# Patient Record
Sex: FEMALE | Race: ASIAN | NOT HISPANIC OR LATINO | Employment: FULL TIME | URBAN - METROPOLITAN AREA
[De-identification: names, ages, dates, MRNs, and addresses within clinical notes are randomized per-mention and may not be internally consistent; named-entity substitution may affect disease eponyms.]

---

## 2017-01-04 ENCOUNTER — OFF PREMISE (OUTPATIENT)
Dept: OTHER | Age: 27
End: 2017-01-04

## 2017-01-04 LAB
25(OH)D3+25(OH)D2 SERPL-MCNC: 16.8 NG/ML (ref 30–100)
ALBUMIN SERPL-MCNC: 4.3 G/DL (ref 3.8–5)
ALP SERPL-CCNC: 80 U/L (ref 35–104)
ALT SERPL-CCNC: 14 U/L (ref 6–37)
AST SERPL-CCNC: 16 U/L (ref 11–33)
BASOPHILS # BLD: 0.04 10E3/UL (ref 0–0.08)
BASOPHILS NFR BLD: 1 % (ref 0–1)
BILIRUB SERPL-MCNC: 0.3 MG/DL (ref 0.2–1.2)
BUN SERPL-MCNC: 5 MG/DL (ref 6–23)
CALCIUM SERPL-MCNC: 9.4 MG/DL (ref 8.6–10.2)
CHLORIDE SERPL-SCNC: 104 MMOL/L (ref 96–105)
CHOLEST SERPL-MCNC: 159 MG/DL
CHOLEST/HDLC SERPL: 2.65 {RATIO}
CO2 SERPL-SCNC: 27 MMOL/L (ref 22–29)
CREAT SERPL-MCNC: 0.34 MG/DL (ref 0.5–1.1)
CREAT UR-MCNC: 17 MG/DL
EOSINOPHIL # BLD: 0.02 10E3/UL (ref 0.03–0.52)
EOSINOPHIL NFR BLD: 0 % (ref 0–6)
ERYTHROCYTE [DISTWIDTH] IN BLOOD: 13.2 % (ref 11–14.9)
EST. AVERAGE GLUCOSE BLD GHB EST-MCNC: 97 MG/DL
GLUCOSE SERPL-MCNC: 90 MG/DL (ref 65–99)
HBA1C MFR BLD: 5 % (ref 3.9–6.1)
HCT VFR BLD CALC: 40 % (ref 34–45)
HDLC SERPL-MCNC: 60 MG/DL (ref 40–60)
HGB BLD-MCNC: 12.8 G/DL (ref 11.3–15.1)
IMMATURE GRANULOCYTES (OFFPRE25): 0 % (ref 0–1)
LDLC SERPL CALC-MCNC: 88 MG/DL
LYMPHOCYTES # BLD: 2.1 10E3/UL (ref 0.9–3.2)
LYMPHOCYTES NFR BLD: 25 % (ref 17–46)
MCH RBC QN AUTO: 31.8 PG (ref 25.7–34.1)
MCHC RBC AUTO-ENTMCNC: 32 G/DL (ref 32–36)
MCV RBC AUTO: 99 FL (ref 79–98)
MICROALBUMIN UR-MCNC: <12 MG/L
MICROALBUMIN/CREAT UR: NORMAL MG/G{CREAT} (ref 0–30)
MONOCYTES # BLD: 0.67 10E3/UL (ref 0.26–0.86)
MONOCYTES NFR BLD: 8 % (ref 4–13)
MPV (OFFPRE2): 12.2 FL (ref 9–11.8)
NEUTROPHILS # BLD: 5.46 10E3/UL (ref 1.9–7.8)
NEUTROPHILS NFR BLD: 66 % (ref 43–74)
NONHDLC SERPL-MCNC: 99 MG/DL
PLATELET # BLD: 278 10E3/UL (ref 165–366)
POTASSIUM SERPL-SCNC: 4.5 MMOL/L (ref 3.4–5.1)
PROT SERPL-MCNC: 7.3 G/DL (ref 6.1–8.2)
RBC # BLD: 4 10E6/UL (ref 3.7–5.2)
SODIUM SERPL-SCNC: 142 MMOL/L (ref 136–145)
TRIGL SERPL-MCNC: 57 MG/DL
TSH SERPL-ACNC: 3.46 UIU/ML (ref 0.45–4.5)
WBC # BLD: 8.3 10E3/UL (ref 3.9–11.2)

## 2017-01-12 ENCOUNTER — OFF PREMISE (OUTPATIENT)
Dept: OTHER | Age: 27
End: 2017-01-12

## 2017-01-12 LAB
MEV IGG SER QL IA: 1.4 AI
RUBV IGG SERPL IA-ACNC: 22.6 IU/ML
VARICELLA-ZOSTER VIRUS IGG AB: >8 AI

## 2017-01-26 ENCOUNTER — OFF PREMISE (OUTPATIENT)
Dept: OTHER | Age: 27
End: 2017-01-26

## 2017-02-14 ENCOUNTER — OFF PREMISE (OUTPATIENT)
Dept: OTHER | Age: 27
End: 2017-02-14

## 2017-02-28 ENCOUNTER — OFF PREMISE (OUTPATIENT)
Dept: OTHER | Age: 27
End: 2017-02-28

## 2017-03-24 ENCOUNTER — OFFICE VISIT (OUTPATIENT)
Dept: OTOLARYNGOLOGY | Age: 27
End: 2017-03-24

## 2017-03-24 ENCOUNTER — APPOINTMENT (OUTPATIENT)
Dept: INTERPRETER SERVICES | Age: 27
End: 2017-03-24

## 2017-03-24 ENCOUNTER — IMAGING SERVICES (OUTPATIENT)
Dept: GENERAL RADIOLOGY | Age: 27
End: 2017-03-24

## 2017-03-24 ENCOUNTER — LAB SERVICES (OUTPATIENT)
Dept: LAB | Age: 27
End: 2017-03-24

## 2017-03-24 VITALS
WEIGHT: 165 LBS | DIASTOLIC BLOOD PRESSURE: 64 MMHG | SYSTOLIC BLOOD PRESSURE: 104 MMHG | HEART RATE: 57 BPM | OXYGEN SATURATION: 99 %

## 2017-03-24 DIAGNOSIS — R05.3 CHRONIC COUGH: ICD-10-CM

## 2017-03-24 DIAGNOSIS — M26.609 TMJ (TEMPOROMANDIBULAR JOINT SYNDROME): ICD-10-CM

## 2017-03-24 DIAGNOSIS — R05.3 CHRONIC COUGH: Primary | ICD-10-CM

## 2017-03-24 DIAGNOSIS — H92.03 OTALGIA, BILATERAL: ICD-10-CM

## 2017-03-24 DIAGNOSIS — K21.9 LPRD (LARYNGOPHARYNGEAL REFLUX DISEASE): ICD-10-CM

## 2017-03-24 DIAGNOSIS — R09.A2 GLOBUS PHARYNGEUS: ICD-10-CM

## 2017-03-24 PROCEDURE — 99244 OFF/OP CNSLTJ NEW/EST MOD 40: CPT | Performed by: OTOLARYNGOLOGY

## 2017-03-24 PROCEDURE — 71020 XR CHEST PA AND LATERAL: CPT | Performed by: RADIOLOGY

## 2017-03-24 PROCEDURE — 31575 DIAGNOSTIC LARYNGOSCOPY: CPT | Performed by: OTOLARYNGOLOGY

## 2017-03-24 RX ORDER — METHOCARBAMOL 500 MG/1
500 TABLET, FILM COATED ORAL 4 TIMES DAILY
COMMUNITY
End: 2018-02-07 | Stop reason: ALTCHOICE

## 2017-03-24 RX ORDER — OMEPRAZOLE 20 MG/1
20 CAPSULE, DELAYED RELEASE ORAL DAILY
COMMUNITY
End: 2017-03-24 | Stop reason: DRUGHIGH

## 2017-03-24 RX ORDER — OMEPRAZOLE 40 MG/1
40 CAPSULE, DELAYED RELEASE ORAL DAILY
Qty: 30 CAPSULE | Refills: 12 | Status: SHIPPED | OUTPATIENT
Start: 2017-03-24 | End: 2018-02-07 | Stop reason: ALTCHOICE

## 2017-03-24 RX ORDER — FLUTICASONE PROPIONATE 50 MCG
1 SPRAY, SUSPENSION (ML) NASAL DAILY
COMMUNITY
End: 2017-04-21 | Stop reason: SDUPTHER

## 2017-03-24 RX ORDER — NAPROXEN 500 MG/1
500 TABLET ORAL
COMMUNITY
End: 2018-02-07 | Stop reason: ALTCHOICE

## 2017-03-25 LAB
ANNOTATION COMMENT IMP: NEGATIVE
M TB IFN-G CD4+ BCKGRND COR BLD-ACNC: 0 IUNITS/ML
M TB IFN-G CD4+ T-CELLS BLD-ACNC: 0.04 IUNITS/ML
M TB TUBERC IGNF/MITOGEN IGNF CONTROL: >10 IUNITS/ML

## 2017-04-19 ENCOUNTER — APPOINTMENT (OUTPATIENT)
Dept: CT IMAGING | Age: 27
End: 2017-04-19
Attending: EMERGENCY MEDICINE

## 2017-04-19 ENCOUNTER — HOSPITAL ENCOUNTER (EMERGENCY)
Age: 27
Discharge: HOME | End: 2017-04-19
Attending: EMERGENCY MEDICINE

## 2017-04-19 VITALS
HEART RATE: 73 BPM | HEIGHT: 64 IN | RESPIRATION RATE: 22 BRPM | WEIGHT: 163.9 LBS | TEMPERATURE: 99.1 F | OXYGEN SATURATION: 100 % | SYSTOLIC BLOOD PRESSURE: 111 MMHG | DIASTOLIC BLOOD PRESSURE: 67 MMHG | BODY MASS INDEX: 27.98 KG/M2

## 2017-04-19 DIAGNOSIS — S16.1XXA CERVICAL STRAIN, ACUTE, INITIAL ENCOUNTER: ICD-10-CM

## 2017-04-19 DIAGNOSIS — R55 VASOVAGAL SYNCOPE: Primary | ICD-10-CM

## 2017-04-19 DIAGNOSIS — S00.03XA CONTUSION OF SCALP, INITIAL ENCOUNTER: ICD-10-CM

## 2017-04-19 PROCEDURE — 96361 HYDRATE IV INFUSION ADD-ON: CPT

## 2017-04-19 PROCEDURE — 72125 CT NECK SPINE W/O DYE: CPT | Performed by: RADIOLOGY

## 2017-04-19 PROCEDURE — 70450 CT HEAD/BRAIN W/O DYE: CPT

## 2017-04-19 PROCEDURE — 72125 CT NECK SPINE W/O DYE: CPT

## 2017-04-19 PROCEDURE — 96360 HYDRATION IV INFUSION INIT: CPT

## 2017-04-19 PROCEDURE — 99284 EMERGENCY DEPT VISIT MOD MDM: CPT

## 2017-04-19 PROCEDURE — 10002807 HB RX 258: Performed by: EMERGENCY MEDICINE

## 2017-04-19 PROCEDURE — 93005 ELECTROCARDIOGRAM TRACING: CPT | Performed by: EMERGENCY MEDICINE

## 2017-04-19 PROCEDURE — 10004651 HB RX, NO CHARGE ITEM: Performed by: EMERGENCY MEDICINE

## 2017-04-19 PROCEDURE — 70450 CT HEAD/BRAIN W/O DYE: CPT | Performed by: RADIOLOGY

## 2017-04-19 RX ORDER — ACETAMINOPHEN 500 MG
1000 TABLET ORAL ONCE
Status: COMPLETED | OUTPATIENT
Start: 2017-04-19 | End: 2017-04-19

## 2017-04-19 RX ADMIN — ACETAMINOPHEN 1000 MG: 500 TABLET ORAL at 15:22

## 2017-04-19 RX ADMIN — SODIUM CHLORIDE 1000 ML: 9 INJECTION, SOLUTION INTRAVENOUS at 14:19

## 2017-04-19 ASSESSMENT — ENCOUNTER SYMPTOMS
RHINORRHEA: 0
DIZZINESS: 0
LIGHT-HEADEDNESS: 1
DIARRHEA: 0
CONSTIPATION: 0
COUGH: 0
APPETITE CHANGE: 0
CONFUSION: 0
FEVER: 0
VOMITING: 0
CHILLS: 0
NERVOUS/ANXIOUS: 0
SHORTNESS OF BREATH: 0
HEADACHES: 1
CHEST TIGHTNESS: 0
ABDOMINAL PAIN: 0
NAUSEA: 0

## 2017-04-19 ASSESSMENT — PAIN SCALES - GENERAL
PAINLEVEL_OUTOF10: 6
PAINLEVEL_OUTOF10: 0
PAINLEVEL_OUTOF10: 6

## 2017-04-20 LAB
ATRIAL RATE (BPM): 77
P AXIS (DEGREES): 64
PR-INTERVAL (MSEC): 130
QRS-INTERVAL (MSEC): 74
QT-INTERVAL (MSEC): 384
QTC: 434
R AXIS (DEGREES): 85
REPORT TEXT: NORMAL
T AXIS (DEGREES): 36
VENTRICULAR RATE EKG/MIN (BPM): 77

## 2017-04-21 ENCOUNTER — OFFICE VISIT (OUTPATIENT)
Dept: OTOLARYNGOLOGY | Age: 27
End: 2017-04-21

## 2017-04-21 ENCOUNTER — APPOINTMENT (OUTPATIENT)
Dept: INTERPRETER SERVICES | Age: 27
End: 2017-04-21

## 2017-04-21 VITALS
WEIGHT: 160.4 LBS | SYSTOLIC BLOOD PRESSURE: 102 MMHG | BODY MASS INDEX: 27.53 KG/M2 | OXYGEN SATURATION: 98 % | DIASTOLIC BLOOD PRESSURE: 68 MMHG | HEART RATE: 79 BPM

## 2017-04-21 DIAGNOSIS — J31.0 RHINITIS, CHRONIC: ICD-10-CM

## 2017-04-21 DIAGNOSIS — R05.3 CHRONIC COUGH: Primary | ICD-10-CM

## 2017-04-21 DIAGNOSIS — J30.9 ALLERGIC RHINITIS, UNSPECIFIED ALLERGIC RHINITIS TRIGGER, UNSPECIFIED RHINITIS SEASONALITY: ICD-10-CM

## 2017-04-21 DIAGNOSIS — H80.93 OTOSCLEROSIS, BILATERAL: ICD-10-CM

## 2017-04-21 PROCEDURE — 99214 OFFICE O/P EST MOD 30 MIN: CPT | Performed by: OTOLARYNGOLOGY

## 2017-04-21 RX ORDER — FLUTICASONE PROPIONATE 50 MCG
2 SPRAY, SUSPENSION (ML) NASAL DAILY
Qty: 16 G | Refills: 12 | Status: SHIPPED | OUTPATIENT
Start: 2017-04-21 | End: 2018-02-07 | Stop reason: ALTCHOICE

## 2017-06-23 ENCOUNTER — OFFICE VISIT (OUTPATIENT)
Dept: AUDIOLOGY | Age: 27
End: 2017-06-23

## 2017-06-23 ENCOUNTER — OFFICE VISIT (OUTPATIENT)
Dept: OTOLARYNGOLOGY | Age: 27
End: 2017-06-23

## 2017-06-23 ENCOUNTER — APPOINTMENT (OUTPATIENT)
Dept: INTERPRETER SERVICES | Age: 27
End: 2017-06-23

## 2017-06-23 VITALS
WEIGHT: 158.8 LBS | DIASTOLIC BLOOD PRESSURE: 66 MMHG | HEART RATE: 67 BPM | BODY MASS INDEX: 27.26 KG/M2 | SYSTOLIC BLOOD PRESSURE: 102 MMHG | OXYGEN SATURATION: 99 %

## 2017-06-23 DIAGNOSIS — R05.3 CHRONIC COUGH: Primary | ICD-10-CM

## 2017-06-23 DIAGNOSIS — K21.9 LPRD (LARYNGOPHARYNGEAL REFLUX DISEASE): ICD-10-CM

## 2017-06-23 DIAGNOSIS — H93.293 ABNORMAL AUDITORY PERCEPTION, BILATERAL: Primary | ICD-10-CM

## 2017-06-23 DIAGNOSIS — R13.12 OROPHARYNGEAL DYSPHAGIA: ICD-10-CM

## 2017-06-23 DIAGNOSIS — H80.93 OTOSCLEROSIS, BILATERAL: ICD-10-CM

## 2017-06-23 LAB
LEFT EAR CANAL VOLUME: NORMAL ML (ref 0.2–2)
LEFT EAR CURVE TYPE: NORMAL
LEFT EAR CURVE: NORMAL
RIGHT EAR CANAL VOLUME: NORMAL ML (ref 0.2–2)
RIGHT EAR CURVE TYPE: NORMAL
RIGHT EAR CURVE: NORMAL

## 2017-06-23 PROCEDURE — 92567 TYMPANOMETRY: CPT | Performed by: AUDIOLOGIST

## 2017-06-23 PROCEDURE — 99214 OFFICE O/P EST MOD 30 MIN: CPT | Performed by: OTOLARYNGOLOGY

## 2017-06-23 PROCEDURE — 92553 AUDIOMETRY AIR & BONE: CPT | Performed by: AUDIOLOGIST

## 2017-07-05 ENCOUNTER — HOSPITAL ENCOUNTER (OUTPATIENT)
Dept: GENERAL RADIOLOGY | Age: 27
Discharge: HOME | End: 2017-07-05
Attending: OTOLARYNGOLOGY

## 2017-07-05 ENCOUNTER — APPOINTMENT (OUTPATIENT)
Dept: INTERPRETER SERVICES | Age: 27
End: 2017-07-05

## 2017-07-05 DIAGNOSIS — R05.3 CHRONIC COUGH: ICD-10-CM

## 2017-07-05 DIAGNOSIS — R13.12 OROPHARYNGEAL DYSPHAGIA: ICD-10-CM

## 2017-07-05 PROCEDURE — 74220 X-RAY XM ESOPHAGUS 1CNTRST: CPT

## 2017-07-05 PROCEDURE — 74230 X-RAY XM SWLNG FUNCJ C+: CPT | Performed by: RADIOLOGY

## 2017-07-05 PROCEDURE — 74230 X-RAY XM SWLNG FUNCJ C+: CPT

## 2017-07-07 ENCOUNTER — OFFICE VISIT (OUTPATIENT)
Dept: OTOLARYNGOLOGY | Age: 27
End: 2017-07-07

## 2017-07-07 ENCOUNTER — APPOINTMENT (OUTPATIENT)
Dept: INTERPRETER SERVICES | Age: 27
End: 2017-07-07

## 2017-07-07 VITALS
OXYGEN SATURATION: 93 % | SYSTOLIC BLOOD PRESSURE: 100 MMHG | DIASTOLIC BLOOD PRESSURE: 62 MMHG | BODY MASS INDEX: 27.14 KG/M2 | HEIGHT: 64 IN | HEART RATE: 66 BPM | WEIGHT: 159 LBS

## 2017-07-07 DIAGNOSIS — R13.12 OROPHARYNGEAL DYSPHAGIA: ICD-10-CM

## 2017-07-07 DIAGNOSIS — J35.01 TONSILLITIS, CHRONIC: Primary | ICD-10-CM

## 2017-07-07 DIAGNOSIS — K21.9 LPRD (LARYNGOPHARYNGEAL REFLUX DISEASE): ICD-10-CM

## 2017-07-07 DIAGNOSIS — R05.3 CHRONIC COUGH: ICD-10-CM

## 2017-07-07 PROCEDURE — 99214 OFFICE O/P EST MOD 30 MIN: CPT | Performed by: OTOLARYNGOLOGY

## 2017-07-07 RX ORDER — AMOXICILLIN 500 MG/1
500 CAPSULE ORAL 3 TIMES DAILY
Qty: 21 CAPSULE | Refills: 0 | Status: SHIPPED | OUTPATIENT
Start: 2017-07-07 | End: 2017-07-14

## 2018-02-07 ENCOUNTER — OFFICE VISIT (OUTPATIENT)
Dept: FAMILY MEDICINE | Age: 28
End: 2018-02-07

## 2018-02-07 VITALS
DIASTOLIC BLOOD PRESSURE: 70 MMHG | BODY MASS INDEX: 25.44 KG/M2 | TEMPERATURE: 97.8 F | HEIGHT: 64 IN | OXYGEN SATURATION: 98 % | SYSTOLIC BLOOD PRESSURE: 100 MMHG | WEIGHT: 149 LBS | HEART RATE: 88 BPM

## 2018-02-07 DIAGNOSIS — R00.0 RACING HEART BEAT: ICD-10-CM

## 2018-02-07 DIAGNOSIS — R07.9 CHEST PAIN, UNSPECIFIED TYPE: ICD-10-CM

## 2018-02-07 DIAGNOSIS — R07.0 CHRONIC THROAT PAIN: ICD-10-CM

## 2018-02-07 DIAGNOSIS — R42 DIZZINESS: Primary | ICD-10-CM

## 2018-02-07 DIAGNOSIS — G89.29 CHRONIC THROAT PAIN: ICD-10-CM

## 2018-02-07 LAB — EKG IMPRESSION: NORMAL

## 2018-02-07 PROCEDURE — 99203 OFFICE O/P NEW LOW 30 MIN: CPT | Performed by: NURSE PRACTITIONER

## 2018-02-07 PROCEDURE — 93000 ELECTROCARDIOGRAM COMPLETE: CPT | Performed by: NURSE PRACTITIONER

## 2018-02-07 RX ORDER — MECLIZINE HYDROCHLORIDE 25 MG/1
25 TABLET ORAL 3 TIMES DAILY PRN
Qty: 30 TABLET | Refills: 1 | Status: SHIPPED | OUTPATIENT
Start: 2018-02-07 | End: 2018-04-05 | Stop reason: ALTCHOICE

## 2018-02-07 ASSESSMENT — PATIENT HEALTH QUESTIONNAIRE - PHQ9
CLINICAL INTERPRETATION OF PHQ2 SCORE: 0
SUM OF ALL RESPONSES TO PHQ9 QUESTIONS 1 AND 2: 0

## 2018-02-19 ENCOUNTER — OFFICE VISIT (OUTPATIENT)
Dept: FAMILY MEDICINE | Age: 28
End: 2018-02-19

## 2018-02-19 VITALS
TEMPERATURE: 98.5 F | BODY MASS INDEX: 24.92 KG/M2 | OXYGEN SATURATION: 99 % | HEART RATE: 81 BPM | DIASTOLIC BLOOD PRESSURE: 64 MMHG | HEIGHT: 64 IN | SYSTOLIC BLOOD PRESSURE: 98 MMHG | WEIGHT: 146 LBS

## 2018-02-19 DIAGNOSIS — G89.29 CHRONIC THROAT PAIN: ICD-10-CM

## 2018-02-19 DIAGNOSIS — R42 DIZZINESS: Primary | ICD-10-CM

## 2018-02-19 DIAGNOSIS — R07.0 CHRONIC THROAT PAIN: ICD-10-CM

## 2018-02-19 PROCEDURE — 99213 OFFICE O/P EST LOW 20 MIN: CPT | Performed by: NURSE PRACTITIONER

## 2018-04-04 ENCOUNTER — NURSE TRIAGE (OUTPATIENT)
Dept: FAMILY MEDICINE | Age: 28
End: 2018-04-04

## 2018-04-05 ENCOUNTER — OFFICE VISIT (OUTPATIENT)
Dept: FAMILY MEDICINE | Age: 28
End: 2018-04-05

## 2018-04-05 VITALS
RESPIRATION RATE: 16 BRPM | WEIGHT: 149.91 LBS | BODY MASS INDEX: 25.59 KG/M2 | DIASTOLIC BLOOD PRESSURE: 62 MMHG | HEIGHT: 64 IN | SYSTOLIC BLOOD PRESSURE: 108 MMHG | HEART RATE: 72 BPM

## 2018-04-05 DIAGNOSIS — A49.9 BACTERIAL INFECTION: ICD-10-CM

## 2018-04-05 DIAGNOSIS — R51.9 RIGHT FACIAL PAIN: ICD-10-CM

## 2018-04-05 DIAGNOSIS — S01.81XA FACIAL LACERATION, INITIAL ENCOUNTER: Primary | ICD-10-CM

## 2018-04-05 PROCEDURE — 99203 OFFICE O/P NEW LOW 30 MIN: CPT | Performed by: FAMILY MEDICINE

## 2018-04-05 RX ORDER — SULFAMETHOXAZOLE AND TRIMETHOPRIM 800; 160 MG/1; MG/1
1 TABLET ORAL 2 TIMES DAILY
Qty: 20 TABLET | Refills: 0 | Status: SHIPPED | OUTPATIENT
Start: 2018-04-05 | End: 2018-06-19 | Stop reason: ALTCHOICE

## 2018-04-05 RX ORDER — TRAMADOL HYDROCHLORIDE 50 MG/1
50 TABLET ORAL EVERY 8 HOURS PRN
Qty: 30 TABLET | Refills: 0 | Status: SHIPPED | OUTPATIENT
Start: 2018-04-05 | End: 2018-06-19 | Stop reason: ALTCHOICE

## 2018-04-11 ENCOUNTER — TELEPHONE (OUTPATIENT)
Dept: SURGERY | Age: 28
End: 2018-04-11

## 2018-04-12 ENCOUNTER — OFFICE VISIT (OUTPATIENT)
Dept: FAMILY MEDICINE | Age: 28
End: 2018-04-12

## 2018-04-12 ENCOUNTER — TELEPHONE (OUTPATIENT)
Dept: FAMILY MEDICINE | Age: 28
End: 2018-04-12

## 2018-04-12 VITALS
WEIGHT: 151 LBS | RESPIRATION RATE: 16 BRPM | BODY MASS INDEX: 25.78 KG/M2 | DIASTOLIC BLOOD PRESSURE: 58 MMHG | HEART RATE: 74 BPM | HEIGHT: 64 IN | OXYGEN SATURATION: 99 % | SYSTOLIC BLOOD PRESSURE: 98 MMHG

## 2018-04-12 DIAGNOSIS — S01.81XD FACIAL LACERATION, SUBSEQUENT ENCOUNTER: Primary | ICD-10-CM

## 2018-04-12 PROCEDURE — 99213 OFFICE O/P EST LOW 20 MIN: CPT | Performed by: FAMILY MEDICINE

## 2018-04-13 ENCOUNTER — TELEPHONE (OUTPATIENT)
Dept: FAMILY MEDICINE | Age: 28
End: 2018-04-13

## 2018-04-17 ENCOUNTER — MED INFO FORMS (OUTPATIENT)
Dept: HEALTH INFORMATION MANAGEMENT | Age: 28
End: 2018-04-17

## 2018-04-19 ENCOUNTER — OFFICE VISIT (OUTPATIENT)
Dept: FAMILY MEDICINE | Age: 28
End: 2018-04-19

## 2018-04-19 VITALS
HEIGHT: 64 IN | OXYGEN SATURATION: 99 % | DIASTOLIC BLOOD PRESSURE: 80 MMHG | HEART RATE: 80 BPM | WEIGHT: 149.91 LBS | SYSTOLIC BLOOD PRESSURE: 100 MMHG | TEMPERATURE: 98.2 F | RESPIRATION RATE: 12 BRPM | BODY MASS INDEX: 25.59 KG/M2

## 2018-04-19 DIAGNOSIS — S01.81XS FACIAL LACERATION, SEQUELA: ICD-10-CM

## 2018-04-19 DIAGNOSIS — J00 ACUTE NASOPHARYNGITIS: Primary | ICD-10-CM

## 2018-04-19 DIAGNOSIS — R05.9 COUGH: ICD-10-CM

## 2018-04-19 DIAGNOSIS — L02.429 BOIL, AXILLA: ICD-10-CM

## 2018-04-19 PROCEDURE — 99214 OFFICE O/P EST MOD 30 MIN: CPT | Performed by: FAMILY MEDICINE

## 2018-04-19 RX ORDER — CEFDINIR 300 MG/1
300 CAPSULE ORAL 2 TIMES DAILY
Qty: 20 CAPSULE | Refills: 0 | Status: SHIPPED | OUTPATIENT
Start: 2018-04-19 | End: 2018-06-19 | Stop reason: ALTCHOICE

## 2018-04-19 ASSESSMENT — PATIENT HEALTH QUESTIONNAIRE - PHQ9
SUM OF ALL RESPONSES TO PHQ9 QUESTIONS 1 AND 2: 0
CLINICAL INTERPRETATION OF PHQ2 SCORE: 0

## 2018-06-19 ENCOUNTER — LAB SERVICES (OUTPATIENT)
Dept: LAB | Age: 28
End: 2018-06-19

## 2018-06-19 ENCOUNTER — OFFICE VISIT (OUTPATIENT)
Dept: FAMILY MEDICINE | Age: 28
End: 2018-06-19

## 2018-06-19 ENCOUNTER — APPOINTMENT (OUTPATIENT)
Dept: FAMILY MEDICINE | Age: 28
End: 2018-06-19

## 2018-06-19 VITALS
HEART RATE: 72 BPM | BODY MASS INDEX: 24.84 KG/M2 | DIASTOLIC BLOOD PRESSURE: 72 MMHG | HEIGHT: 64 IN | WEIGHT: 145.5 LBS | SYSTOLIC BLOOD PRESSURE: 102 MMHG | RESPIRATION RATE: 15 BRPM

## 2018-06-19 DIAGNOSIS — Z00.00 GENERAL MEDICAL EXAMINATION: Primary | ICD-10-CM

## 2018-06-19 DIAGNOSIS — Z00.00 GENERAL MEDICAL EXAMINATION: ICD-10-CM

## 2018-06-19 DIAGNOSIS — E55.9 VITAMIN D DEFICIENCY: Primary | ICD-10-CM

## 2018-06-19 DIAGNOSIS — E55.9 VITAMIN D DEFICIENCY: ICD-10-CM

## 2018-06-19 LAB
25(OH)D3+25(OH)D2 SERPL-MCNC: 17.6 NG/ML (ref 30–100)
ALBUMIN SERPL-MCNC: 3.8 G/DL (ref 3.6–5.1)
ALBUMIN/GLOB SERPL: 1.2 {RATIO} (ref 1–2.4)
ALP SERPL-CCNC: 77 UNITS/L (ref 45–117)
ALT SERPL-CCNC: 17 UNITS/L
ANION GAP SERPL CALC-SCNC: 12 MMOL/L (ref 10–20)
AST SERPL-CCNC: 17 UNITS/L
BASOPHILS # BLD AUTO: 0 K/MCL (ref 0–0.3)
BASOPHILS NFR BLD AUTO: 1 %
BILIRUB SERPL-MCNC: 0.7 MG/DL (ref 0.2–1)
BUN SERPL-MCNC: 9 MG/DL (ref 6–20)
BUN/CREAT SERPL: 18 (ref 7–25)
CALCIUM SERPL-MCNC: 8.7 MG/DL (ref 8.4–10.2)
CHLORIDE SERPL-SCNC: 103 MMOL/L (ref 98–107)
CHOLEST SERPL-MCNC: 176 MG/DL
CHOLEST/HDLC SERPL: 2.5 {RATIO}
CO2 SERPL-SCNC: 29 MMOL/L (ref 21–32)
CREAT SERPL-MCNC: 0.49 MG/DL (ref 0.51–0.95)
DIFFERENTIAL METHOD BLD: ABNORMAL
EOSINOPHIL # BLD AUTO: 0 K/MCL (ref 0.1–0.5)
EOSINOPHIL NFR SPEC: 1 %
ERYTHROCYTE [DISTWIDTH] IN BLOOD: 12.6 % (ref 11–15)
FASTING STATUS PATIENT QL REPORTED: 12 HRS
GLOBULIN SER-MCNC: 3.3 G/DL (ref 2–4)
GLUCOSE SERPL-MCNC: 79 MG/DL (ref 65–99)
HCT VFR BLD CALC: 37.8 % (ref 36–46.5)
HDLC SERPL-MCNC: 70 MG/DL
HGB BLD-MCNC: 12.3 G/DL (ref 12–15.5)
IMM GRANULOCYTES # BLD AUTO: 0 K/MCL (ref 0–0.2)
IMM GRANULOCYTES NFR BLD: 0 %
LDLC SERPL-MCNC: 99 MG/DL
LENGTH OF FAST TIME PATIENT: 12 HRS
LYMPHOCYTES # BLD MANUAL: 1.9 K/MCL (ref 1–4.8)
LYMPHOCYTES NFR BLD MANUAL: 38 %
MCH RBC QN AUTO: 32.4 PG (ref 26–34)
MCHC RBC AUTO-ENTMCNC: 32.5 G/DL (ref 32–36.5)
MCV RBC AUTO: 99.5 FL (ref 78–100)
MONOCYTES # BLD MANUAL: 0.5 K/MCL (ref 0.3–0.9)
MONOCYTES NFR BLD MANUAL: 10 %
NEUTROPHILS # BLD: 2.6 K/MCL (ref 1.8–7.7)
NEUTROPHILS NFR BLD AUTO: 50 %
NONHDLC SERPL-MCNC: 106 MG/DL
NRBC BLD MANUAL-RTO: 0 /100 WBC
PLATELET # BLD: 178 K/MCL (ref 140–450)
POTASSIUM SERPL-SCNC: 4.1 MMOL/L (ref 3.4–5.1)
PROT SERPL-MCNC: 7.1 G/DL (ref 6.4–8.2)
RBC # BLD: 3.8 MIL/MCL (ref 4–5.2)
SODIUM SERPL-SCNC: 140 MMOL/L (ref 135–145)
TRIGL SERPL-MCNC: 33 MG/DL
TSH SERPL-ACNC: 2.18 MCUNITS/ML (ref 0.35–5)
WBC # BLD: 5.1 K/MCL (ref 4.2–11)

## 2018-06-19 PROCEDURE — 99395 PREV VISIT EST AGE 18-39: CPT | Performed by: FAMILY MEDICINE

## 2018-06-19 PROCEDURE — 82306 VITAMIN D 25 HYDROXY: CPT | Performed by: INTERNAL MEDICINE

## 2018-06-19 PROCEDURE — 80050 GENERAL HEALTH PANEL: CPT | Performed by: INTERNAL MEDICINE

## 2018-06-19 PROCEDURE — 36415 COLL VENOUS BLD VENIPUNCTURE: CPT | Performed by: INTERNAL MEDICINE

## 2018-06-19 PROCEDURE — 81001 URINALYSIS AUTO W/SCOPE: CPT | Performed by: INTERNAL MEDICINE

## 2018-06-19 PROCEDURE — 80061 LIPID PANEL: CPT | Performed by: INTERNAL MEDICINE

## 2018-06-20 LAB
APPEARANCE UR: CLEAR
BACTERIA #/AREA URNS HPF: NORMAL /HPF
BILIRUB UR QL STRIP: NEGATIVE
COLOR UR: YELLOW
GLUCOSE UR STRIP-MCNC: NEGATIVE MG/DL
HGB UR QL STRIP: NEGATIVE
HYALINE CASTS #/AREA URNS LPF: NORMAL /LPF (ref 0–5)
KETONES UR STRIP-MCNC: NEGATIVE MG/DL
LEUKOCYTE ESTERASE UR QL STRIP: NEGATIVE
NITRITE UR QL STRIP: NEGATIVE
PH UR STRIP: 6 UNITS (ref 5–7)
PROT UR STRIP-MCNC: NEGATIVE MG/DL
RBC #/AREA URNS HPF: NORMAL /HPF (ref 0–3)
SP GR UR STRIP: 1.02 (ref 1–1.03)
SPECIMEN SOURCE: NORMAL
SQUAMOUS #/AREA URNS HPF: NORMAL /HPF (ref 0–5)
UROBILINOGEN UR STRIP-MCNC: 1 MG/DL (ref 0–1)
WBC #/AREA URNS HPF: NORMAL /HPF (ref 0–5)

## 2018-06-20 RX ORDER — ERGOCALCIFEROL 1.25 MG/1
1 CAPSULE ORAL
Qty: 4 CAPSULE | Refills: 3 | Status: SHIPPED | OUTPATIENT
Start: 2018-06-25 | End: 2018-11-19 | Stop reason: SDUPTHER

## 2018-06-21 ENCOUNTER — TELEPHONE (OUTPATIENT)
Dept: INFECTIOUS DISEASES | Age: 28
End: 2018-06-21

## 2018-07-19 ENCOUNTER — OFFICE VISIT (OUTPATIENT)
Dept: FAMILY MEDICINE | Age: 28
End: 2018-07-19

## 2018-07-19 ENCOUNTER — LAB SERVICES (OUTPATIENT)
Dept: LAB | Age: 28
End: 2018-07-19

## 2018-07-19 VITALS
HEIGHT: 64 IN | RESPIRATION RATE: 16 BRPM | OXYGEN SATURATION: 93 % | BODY MASS INDEX: 25.24 KG/M2 | SYSTOLIC BLOOD PRESSURE: 90 MMHG | DIASTOLIC BLOOD PRESSURE: 60 MMHG | WEIGHT: 147.82 LBS | HEART RATE: 86 BPM

## 2018-07-19 DIAGNOSIS — R68.83 CHILLS: ICD-10-CM

## 2018-07-19 DIAGNOSIS — N89.8 VAGINAL DISCHARGE: ICD-10-CM

## 2018-07-19 DIAGNOSIS — K21.00 GASTROESOPHAGEAL REFLUX DISEASE WITH ESOPHAGITIS: ICD-10-CM

## 2018-07-19 DIAGNOSIS — R11.2 NAUSEA AND VOMITING IN ADULT: ICD-10-CM

## 2018-07-19 DIAGNOSIS — R11.2 NAUSEA AND VOMITING IN ADULT: Primary | ICD-10-CM

## 2018-07-19 LAB
ALBUMIN SERPL-MCNC: 3.6 G/DL (ref 3.6–5.1)
ALBUMIN/GLOB SERPL: 1.1 {RATIO} (ref 1–2.4)
ALP SERPL-CCNC: 72 UNITS/L (ref 45–117)
ALT SERPL-CCNC: 18 UNITS/L
ANION GAP SERPL CALC-SCNC: 11 MMOL/L (ref 10–20)
AST SERPL-CCNC: 9 UNITS/L
BILIRUB SERPL-MCNC: 0.7 MG/DL (ref 0.2–1)
BUN SERPL-MCNC: 5 MG/DL (ref 6–20)
BUN/CREAT SERPL: 10 (ref 7–25)
CALCIUM SERPL-MCNC: 8.8 MG/DL (ref 8.4–10.2)
CHLORIDE SERPL-SCNC: 103 MMOL/L (ref 98–107)
CO2 SERPL-SCNC: 29 MMOL/L (ref 21–32)
CREAT SERPL-MCNC: 0.5 MG/DL (ref 0.51–0.95)
FASTING STATUS PATIENT QL REPORTED: 2 HRS
GLOBULIN SER-MCNC: 3.3 G/DL (ref 2–4)
GLUCOSE SERPL-MCNC: 78 MG/DL (ref 65–99)
POTASSIUM SERPL-SCNC: 4.1 MMOL/L (ref 3.4–5.1)
PROT SERPL-MCNC: 6.9 G/DL (ref 6.4–8.2)
SODIUM SERPL-SCNC: 139 MMOL/L (ref 135–145)

## 2018-07-19 PROCEDURE — 80053 COMPREHEN METABOLIC PANEL: CPT | Performed by: INTERNAL MEDICINE

## 2018-07-19 PROCEDURE — 36415 COLL VENOUS BLD VENIPUNCTURE: CPT | Performed by: INTERNAL MEDICINE

## 2018-07-19 PROCEDURE — 83014 H PYLORI DRUG ADMIN: CPT | Performed by: INTERNAL MEDICINE

## 2018-07-19 PROCEDURE — 83013 H PYLORI (C-13) BREATH: CPT | Performed by: INTERNAL MEDICINE

## 2018-07-19 PROCEDURE — 85025 COMPLETE CBC W/AUTO DIFF WBC: CPT | Performed by: INTERNAL MEDICINE

## 2018-07-19 PROCEDURE — 99214 OFFICE O/P EST MOD 30 MIN: CPT | Performed by: FAMILY MEDICINE

## 2018-07-19 PROCEDURE — 81001 URINALYSIS AUTO W/SCOPE: CPT | Performed by: INTERNAL MEDICINE

## 2018-07-19 RX ORDER — NICOTINE POLACRILEX 4 MG/1
20 GUM, CHEWING ORAL DAILY
Qty: 30 TABLET | Refills: 1 | Status: SHIPPED | OUTPATIENT
Start: 2018-07-19 | End: 2018-11-21 | Stop reason: SDUPTHER

## 2018-07-19 RX ORDER — METRONIDAZOLE 500 MG/1
500 TABLET ORAL
Qty: 14 TABLET | Refills: 0 | Status: SHIPPED | OUTPATIENT
Start: 2018-07-19 | End: 2018-12-04 | Stop reason: ALTCHOICE

## 2018-07-19 ASSESSMENT — PATIENT HEALTH QUESTIONNAIRE - PHQ9
CLINICAL INTERPRETATION OF PHQ2 SCORE: 0
SUM OF ALL RESPONSES TO PHQ9 QUESTIONS 1 AND 2: 0

## 2018-07-20 ENCOUNTER — TELEPHONE (OUTPATIENT)
Dept: FAMILY MEDICINE | Age: 28
End: 2018-07-20

## 2018-07-20 ENCOUNTER — LAB SERVICES (OUTPATIENT)
Dept: LAB | Age: 28
End: 2018-07-20

## 2018-07-20 DIAGNOSIS — R71.8 ELEVATED MCV: Primary | ICD-10-CM

## 2018-07-20 DIAGNOSIS — R71.8 ELEVATED MCV: ICD-10-CM

## 2018-07-20 LAB
APPEARANCE UR: CLEAR
BACTERIA #/AREA URNS HPF: ABNORMAL /HPF
BASOPHILS # BLD AUTO: 0 K/MCL (ref 0–0.3)
BASOPHILS NFR BLD AUTO: 1 %
BILIRUB UR QL STRIP: NEGATIVE
COLOR UR: YELLOW
DIFFERENTIAL METHOD BLD: ABNORMAL
EOSINOPHIL # BLD AUTO: 0 K/MCL (ref 0.1–0.5)
EOSINOPHIL NFR SPEC: 1 %
ERYTHROCYTE [DISTWIDTH] IN BLOOD: 12.6 % (ref 11–15)
GLUCOSE UR STRIP-MCNC: NEGATIVE MG/DL
HCT VFR BLD CALC: 37.5 % (ref 36–46.5)
HGB BLD-MCNC: 12 G/DL (ref 12–15.5)
HGB UR QL STRIP: NEGATIVE
HYALINE CASTS #/AREA URNS LPF: ABNORMAL /LPF (ref 0–5)
IMM GRANULOCYTES # BLD AUTO: 0 K/MCL (ref 0–0.2)
IMM GRANULOCYTES NFR BLD: 0 %
KETONES UR STRIP-MCNC: NEGATIVE MG/DL
LEUKOCYTE ESTERASE UR QL STRIP: NEGATIVE
LYMPHOCYTES # BLD MANUAL: 1.7 K/MCL (ref 1–4.8)
LYMPHOCYTES NFR BLD MANUAL: 40 %
MCH RBC QN AUTO: 32.1 PG (ref 26–34)
MCHC RBC AUTO-ENTMCNC: 32 G/DL (ref 32–36.5)
MCV RBC AUTO: 100.3 FL (ref 78–100)
MONOCYTES # BLD MANUAL: 0.4 K/MCL (ref 0.3–0.9)
MONOCYTES NFR BLD MANUAL: 10 %
NEUTROPHILS # BLD: 2 K/MCL (ref 1.8–7.7)
NEUTROPHILS NFR BLD AUTO: 48 %
NITRITE UR QL STRIP: NEGATIVE
NRBC BLD MANUAL-RTO: 0 /100 WBC
PH UR STRIP: 8 UNITS (ref 5–7)
PLATELET # BLD: 205 K/MCL (ref 140–450)
PROT UR STRIP-MCNC: NEGATIVE MG/DL
RBC # BLD: 3.74 MIL/MCL (ref 4–5.2)
RBC #/AREA URNS HPF: ABNORMAL /HPF (ref 0–3)
SP GR UR STRIP: 1.01 (ref 1–1.03)
SPECIMEN SOURCE: ABNORMAL
SQUAMOUS #/AREA URNS HPF: ABNORMAL /HPF (ref 0–5)
UREA BREATH TEST QL: NEGATIVE
UROBILINOGEN UR STRIP-MCNC: 0.2 MG/DL (ref 0–1)
WBC # BLD: 4.2 K/MCL (ref 4.2–11)
WBC #/AREA URNS HPF: ABNORMAL /HPF (ref 0–5)

## 2018-07-20 PROCEDURE — 36415 COLL VENOUS BLD VENIPUNCTURE: CPT | Performed by: INTERNAL MEDICINE

## 2018-07-20 PROCEDURE — 82746 ASSAY OF FOLIC ACID SERUM: CPT | Performed by: INTERNAL MEDICINE

## 2018-07-20 PROCEDURE — 82607 VITAMIN B-12: CPT | Performed by: INTERNAL MEDICINE

## 2018-07-21 LAB
FOLATE SERPL-MCNC: 14.4 NG/ML
VIT B12 SERPL-MCNC: 258 PG/ML (ref 211–911)

## 2018-07-23 ENCOUNTER — TELEPHONE (OUTPATIENT)
Dept: FAMILY MEDICINE | Age: 28
End: 2018-07-23

## 2018-07-30 ENCOUNTER — OFFICE VISIT (OUTPATIENT)
Dept: FAMILY MEDICINE | Age: 28
End: 2018-07-30

## 2018-07-30 VITALS
WEIGHT: 149.25 LBS | OXYGEN SATURATION: 98 % | DIASTOLIC BLOOD PRESSURE: 60 MMHG | TEMPERATURE: 98.8 F | HEART RATE: 65 BPM | SYSTOLIC BLOOD PRESSURE: 92 MMHG | HEIGHT: 64 IN | RESPIRATION RATE: 16 BRPM | BODY MASS INDEX: 25.48 KG/M2

## 2018-07-30 DIAGNOSIS — F32.A MILD DEPRESSION: ICD-10-CM

## 2018-07-30 DIAGNOSIS — K21.00 GASTROESOPHAGEAL REFLUX DISEASE WITH ESOPHAGITIS: ICD-10-CM

## 2018-07-30 DIAGNOSIS — R53.83 OTHER FATIGUE: Primary | ICD-10-CM

## 2018-07-30 PROCEDURE — 99214 OFFICE O/P EST MOD 30 MIN: CPT | Performed by: FAMILY MEDICINE

## 2018-07-30 ASSESSMENT — PATIENT HEALTH QUESTIONNAIRE - PHQ9
SUM OF ALL RESPONSES TO PHQ9 QUESTIONS 1 TO 9: 5
SUM OF ALL RESPONSES TO PHQ9 QUESTIONS 1 AND 2: 1
5. POOR APPETITE OR OVEREATING: MORE THAN HALF THE DAYS
SUM OF ALL RESPONSES TO PHQ QUESTIONS 1-9: 5
CLINICAL INTERPRETATION OF PHQ2 SCORE: 1

## 2018-11-07 ENCOUNTER — TELEPHONE (OUTPATIENT)
Dept: FAMILY MEDICINE | Age: 28
End: 2018-11-07

## 2018-11-19 RX ORDER — ERGOCALCIFEROL 1.25 MG/1
CAPSULE ORAL
Qty: 4 CAPSULE | Refills: 3 | Status: SHIPPED | OUTPATIENT
Start: 2018-11-19 | End: 2019-08-07 | Stop reason: SDUPTHER

## 2018-11-21 DIAGNOSIS — K21.00 GASTROESOPHAGEAL REFLUX DISEASE WITH ESOPHAGITIS: ICD-10-CM

## 2018-11-23 RX ORDER — OMEPRAZOLE 20 MG/1
CAPSULE, DELAYED RELEASE ORAL
Qty: 30 CAPSULE | Refills: 3 | Status: SHIPPED | OUTPATIENT
Start: 2018-11-23 | End: 2018-12-04 | Stop reason: ALTCHOICE

## 2018-12-04 ENCOUNTER — TELEPHONE (OUTPATIENT)
Dept: NEUROLOGY | Age: 28
End: 2018-12-04

## 2018-12-04 ENCOUNTER — OFFICE VISIT (OUTPATIENT)
Dept: FAMILY MEDICINE | Age: 28
End: 2018-12-04

## 2018-12-04 ENCOUNTER — LAB SERVICES (OUTPATIENT)
Dept: LAB | Age: 28
End: 2018-12-04

## 2018-12-04 ENCOUNTER — TELEPHONE (OUTPATIENT)
Dept: FAMILY MEDICINE | Age: 28
End: 2018-12-04

## 2018-12-04 VITALS
DIASTOLIC BLOOD PRESSURE: 58 MMHG | HEIGHT: 64 IN | HEART RATE: 64 BPM | WEIGHT: 143.08 LBS | RESPIRATION RATE: 15 BRPM | BODY MASS INDEX: 24.43 KG/M2 | SYSTOLIC BLOOD PRESSURE: 98 MMHG

## 2018-12-04 DIAGNOSIS — R51.9 SEVERE HEADACHE: Primary | ICD-10-CM

## 2018-12-04 DIAGNOSIS — N89.8 VAGINAL DISCHARGE: ICD-10-CM

## 2018-12-04 DIAGNOSIS — G44.059 SHORT UNILATERAL NEURALGIFORM HEADACHE, CONJUNCTIVAL INJECTION/TEARING: ICD-10-CM

## 2018-12-04 DIAGNOSIS — N34.3 DYSURIA-FREQUENCY SYNDROME: ICD-10-CM

## 2018-12-04 PROCEDURE — 99214 OFFICE O/P EST MOD 30 MIN: CPT | Performed by: FAMILY MEDICINE

## 2018-12-04 PROCEDURE — 81001 URINALYSIS AUTO W/SCOPE: CPT | Performed by: INTERNAL MEDICINE

## 2018-12-04 RX ORDER — NAPROXEN 500 MG/1
500 TABLET ORAL 2 TIMES DAILY WITH MEALS
Qty: 20 TABLET | Refills: 0 | Status: SHIPPED | OUTPATIENT
Start: 2018-12-04 | End: 2019-01-03

## 2018-12-04 RX ORDER — METRONIDAZOLE 500 MG/1
500 TABLET ORAL
Qty: 14 TABLET | Refills: 0 | Status: SHIPPED | OUTPATIENT
Start: 2018-12-04 | End: 2019-02-01 | Stop reason: ALTCHOICE

## 2018-12-05 LAB
APPEARANCE UR: CLEAR
BACTERIA #/AREA URNS HPF: ABNORMAL /HPF
BILIRUB UR QL STRIP: NEGATIVE
COLOR UR: YELLOW
GLUCOSE UR STRIP-MCNC: NEGATIVE MG/DL
HGB UR QL STRIP: ABNORMAL
HYALINE CASTS #/AREA URNS LPF: ABNORMAL /LPF (ref 0–5)
KETONES UR STRIP-MCNC: NEGATIVE MG/DL
LEUKOCYTE ESTERASE UR QL STRIP: NEGATIVE
NITRITE UR QL STRIP: NEGATIVE
PH UR STRIP: 6 UNITS (ref 5–7)
PROT UR STRIP-MCNC: NEGATIVE MG/DL
RBC #/AREA URNS HPF: ABNORMAL /HPF (ref 0–3)
SP GR UR STRIP: 1.01 (ref 1–1.03)
SPECIMEN SOURCE: ABNORMAL
SQUAMOUS #/AREA URNS HPF: ABNORMAL /HPF (ref 0–5)
UROBILINOGEN UR STRIP-MCNC: 0.2 MG/DL (ref 0–1)
WBC #/AREA URNS HPF: ABNORMAL /HPF (ref 0–5)

## 2018-12-06 ENCOUNTER — TELEPHONE (OUTPATIENT)
Dept: FAMILY MEDICINE | Age: 28
End: 2018-12-06

## 2018-12-06 ENCOUNTER — TELEPHONE (OUTPATIENT)
Dept: NEUROLOGY | Age: 28
End: 2018-12-06

## 2018-12-13 ENCOUNTER — TELEPHONE (OUTPATIENT)
Dept: FAMILY MEDICINE | Age: 28
End: 2018-12-13

## 2018-12-18 ENCOUNTER — IMAGING SERVICES (OUTPATIENT)
Dept: MRI IMAGING | Age: 28
End: 2018-12-18
Attending: FAMILY MEDICINE

## 2018-12-18 DIAGNOSIS — G44.059 SHORT UNILATERAL NEURALGIFORM HEADACHE, CONJUNCTIVAL INJECTION/TEARING: ICD-10-CM

## 2018-12-18 DIAGNOSIS — R51.9 SEVERE HEADACHE: ICD-10-CM

## 2018-12-18 PROCEDURE — 70551 MRI BRAIN STEM W/O DYE: CPT | Performed by: RADIOLOGY

## 2018-12-20 ENCOUNTER — TELEPHONE (OUTPATIENT)
Dept: FAMILY MEDICINE | Age: 28
End: 2018-12-20

## 2019-01-01 ENCOUNTER — EXTERNAL RECORD (OUTPATIENT)
Dept: OTHER | Age: 29
End: 2019-01-01

## 2019-01-07 ENCOUNTER — OFFICE VISIT (OUTPATIENT)
Dept: NEUROLOGY | Age: 29
End: 2019-01-07
Attending: FAMILY MEDICINE

## 2019-01-07 VITALS
WEIGHT: 141 LBS | HEART RATE: 60 BPM | SYSTOLIC BLOOD PRESSURE: 110 MMHG | BODY MASS INDEX: 24.07 KG/M2 | HEIGHT: 64 IN | DIASTOLIC BLOOD PRESSURE: 60 MMHG

## 2019-01-07 DIAGNOSIS — G44.059 SUNCT (SHORT UNILATERAL NEURALGIFORM HEADACHE, CONJUNCTIVAL INJ/TEAR): Primary | ICD-10-CM

## 2019-01-07 PROCEDURE — 99244 OFF/OP CNSLTJ NEW/EST MOD 40: CPT | Performed by: PSYCHIATRY & NEUROLOGY

## 2019-01-07 RX ORDER — TOPIRAMATE 25 MG/1
50 TABLET ORAL 2 TIMES DAILY
Qty: 120 TABLET | Refills: 2 | Status: SHIPPED | OUTPATIENT
Start: 2019-01-07 | End: 2019-04-10 | Stop reason: ALTCHOICE

## 2019-01-25 ENCOUNTER — OFFICE VISIT (OUTPATIENT)
Dept: FAMILY MEDICINE | Age: 29
End: 2019-01-25

## 2019-01-25 VITALS
BODY MASS INDEX: 23.37 KG/M2 | DIASTOLIC BLOOD PRESSURE: 52 MMHG | RESPIRATION RATE: 15 BRPM | HEART RATE: 62 BPM | HEIGHT: 64 IN | WEIGHT: 136.91 LBS | SYSTOLIC BLOOD PRESSURE: 106 MMHG

## 2019-01-25 DIAGNOSIS — R42 DIZZINESS: ICD-10-CM
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
## 2019-01-25 DIAGNOSIS — M54.2 NECK PAIN ON RIGHT SIDE: ICD-10-CM

## 2019-01-25 PROCEDURE — 99214 OFFICE O/P EST MOD 30 MIN: CPT | Performed by: FAMILY MEDICINE

## 2019-01-25 RX ORDER — LIDOCAINE 50 MG/G
OINTMENT TOPICAL 2 TIMES DAILY PRN
Qty: 35.44 G | Refills: 0 | Status: SHIPPED | OUTPATIENT
Start: 2019-01-25 | End: 2019-03-12 | Stop reason: SDUPTHER

## 2019-01-25 RX ORDER — LIDOCAINE 50 MG/G
OINTMENT TOPICAL 2 TIMES DAILY PRN
Qty: 35.44 G | Refills: 0 | Status: SHIPPED | OUTPATIENT
Start: 2019-01-25 | End: 2019-01-25 | Stop reason: SDUPTHER

## 2019-01-25 RX ORDER — NAPROXEN 500 MG/1
500 TABLET ORAL 2 TIMES DAILY WITH MEALS
Qty: 20 TABLET | Refills: 0 | Status: SHIPPED | OUTPATIENT
Start: 2019-01-25 | End: 2019-01-25 | Stop reason: SDUPTHER

## 2019-01-25 RX ORDER — NAPROXEN 500 MG/1
500 TABLET ORAL 2 TIMES DAILY WITH MEALS
Qty: 20 TABLET | Refills: 0 | Status: SHIPPED | OUTPATIENT
Start: 2019-01-25 | End: 2019-02-24

## 2019-01-25 RX ORDER — TIZANIDINE 4 MG/1
4 TABLET ORAL EVERY 8 HOURS PRN
Qty: 30 TABLET | Refills: 0 | Status: SHIPPED | OUTPATIENT
Start: 2019-01-25 | End: 2019-01-25 | Stop reason: SDUPTHER

## 2019-01-25 RX ORDER — TIZANIDINE 4 MG/1
4 TABLET ORAL EVERY 8 HOURS PRN
Qty: 30 TABLET | Refills: 0 | Status: SHIPPED | OUTPATIENT
Start: 2019-01-25 | End: 2019-03-12 | Stop reason: ALTCHOICE

## 2019-02-01 ENCOUNTER — OFFICE VISIT (OUTPATIENT)
Dept: FAMILY MEDICINE | Age: 29
End: 2019-02-01

## 2019-02-01 ENCOUNTER — TELEPHONE (OUTPATIENT)
Dept: FAMILY MEDICINE | Age: 29
End: 2019-02-01

## 2019-02-01 VITALS
WEIGHT: 136.69 LBS | DIASTOLIC BLOOD PRESSURE: 66 MMHG | RESPIRATION RATE: 15 BRPM | SYSTOLIC BLOOD PRESSURE: 106 MMHG | HEART RATE: 68 BPM | HEIGHT: 64 IN | BODY MASS INDEX: 23.34 KG/M2
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
## 2019-02-01 DIAGNOSIS — M54.2 ACUTE NECK PAIN: Primary | ICD-10-CM

## 2019-02-01 DIAGNOSIS — K21.00 GASTROESOPHAGEAL REFLUX DISEASE WITH ESOPHAGITIS: ICD-10-CM

## 2019-02-01 DIAGNOSIS — G44.059 SUNCT (SHORT UNILATERAL NEURALGIFORM HEADACHE, CONJUNCTIVAL INJ/TEAR): ICD-10-CM

## 2019-02-01 PROCEDURE — 99214 OFFICE O/P EST MOD 30 MIN: CPT | Performed by: FAMILY MEDICINE

## 2019-02-01 RX ORDER — DULOXETIN HYDROCHLORIDE 20 MG/1
CAPSULE, DELAYED RELEASE ORAL
Refills: 0 | COMMUNITY
Start: 2018-11-02 | End: 2019-02-01 | Stop reason: ALTCHOICE

## 2019-02-01 RX ORDER — OMEPRAZOLE 40 MG/1
CAPSULE, DELAYED RELEASE ORAL
COMMUNITY
Start: 2017-04-17 | End: 2019-02-01 | Stop reason: SDUPTHER

## 2019-02-01 RX ORDER — OMEPRAZOLE 20 MG/1
20 CAPSULE, DELAYED RELEASE ORAL DAILY
Qty: 30 CAPSULE | Refills: 3 | Status: SHIPPED | OUTPATIENT
Start: 2019-02-01 | End: 2019-02-01 | Stop reason: SDUPTHER

## 2019-02-01 RX ORDER — CYCLOBENZAPRINE HCL 10 MG
TABLET ORAL
Refills: 0 | COMMUNITY
Start: 2018-11-02 | End: 2019-02-01 | Stop reason: ALTCHOICE

## 2019-02-01 RX ORDER — INFLUENZA VIRUS VACCINE 15; 15; 15; 15 UG/.5ML; UG/.5ML; UG/.5ML; UG/.5ML
SUSPENSION INTRAMUSCULAR
Refills: 0 | COMMUNITY
Start: 2018-11-06 | End: 2019-03-26 | Stop reason: ALTCHOICE

## 2019-02-01 RX ORDER — CETIRIZINE HYDROCHLORIDE 10 MG/1
TABLET ORAL
Refills: 0 | COMMUNITY
Start: 2018-11-02 | End: 2019-02-01 | Stop reason: ALTCHOICE

## 2019-02-01 RX ORDER — DULOXETIN HYDROCHLORIDE 20 MG/1
20 CAPSULE, DELAYED RELEASE ORAL DAILY
Qty: 90 CAPSULE | Refills: 1 | Status: SHIPPED | OUTPATIENT
Start: 2019-02-01 | End: 2019-03-12 | Stop reason: SDUPTHER

## 2019-02-01 RX ORDER — DULOXETIN HYDROCHLORIDE 20 MG/1
20 CAPSULE, DELAYED RELEASE ORAL DAILY
Qty: 30 CAPSULE | Refills: 3 | Status: SHIPPED | OUTPATIENT
Start: 2019-02-01 | End: 2019-03-12 | Stop reason: SDUPTHER

## 2019-02-01 RX ORDER — OMEPRAZOLE 20 MG/1
20 CAPSULE, DELAYED RELEASE ORAL DAILY
Qty: 90 CAPSULE | Refills: 1 | Status: SHIPPED | OUTPATIENT
Start: 2019-02-01

## 2019-02-01 RX ORDER — METHOCARBAMOL 500 MG/1
TABLET, FILM COATED ORAL
COMMUNITY
Start: 2017-04-17 | End: 2019-02-01 | Stop reason: ALTCHOICE

## 2019-02-05 ENCOUNTER — IMAGING SERVICES (OUTPATIENT)
Dept: GENERAL RADIOLOGY | Age: 29
End: 2019-02-05

## 2019-02-05 DIAGNOSIS — M54.2 ACUTE NECK PAIN: ICD-10-CM

## 2019-02-05 PROCEDURE — 72050 X-RAY EXAM NECK SPINE 4/5VWS: CPT | Performed by: RADIOLOGY

## 2019-02-07 ENCOUNTER — TELEPHONE (OUTPATIENT)
Dept: FAMILY MEDICINE | Age: 29
End: 2019-02-07

## 2019-02-14 ENCOUNTER — OFFICE VISIT (OUTPATIENT)
Dept: ORTHOPEDICS | Age: 29
End: 2019-02-14
Attending: FAMILY MEDICINE

## 2019-02-14 DIAGNOSIS — M54.2 NECK PAIN: Primary | ICD-10-CM

## 2019-02-14 PROCEDURE — 99243 OFF/OP CNSLTJ NEW/EST LOW 30: CPT | Performed by: PHYSICIAN ASSISTANT

## 2019-02-14 ASSESSMENT — ENCOUNTER SYMPTOMS
FATIGUE: 0
NUMBNESS: 1
DIZZINESS: 0
SHORTNESS OF BREATH: 0
WEAKNESS: 1
FEVER: 0
LIGHT-HEADEDNESS: 0

## 2019-02-15 ENCOUNTER — MED INFO FORMS (OUTPATIENT)
Dept: HEALTH INFORMATION MANAGEMENT | Age: 29
End: 2019-02-15

## 2019-02-20 ENCOUNTER — MED INFO FORMS (OUTPATIENT)
Dept: HEALTH INFORMATION MANAGEMENT | Age: 29
End: 2019-02-20

## 2019-02-26 ENCOUNTER — TELEPHONE (OUTPATIENT)
Dept: FAMILY MEDICINE | Age: 29
End: 2019-02-26

## 2019-03-01 ENCOUNTER — TELEPHONE (OUTPATIENT)
Dept: ORTHOPEDICS | Age: 29
End: 2019-03-01

## 2019-03-05 ENCOUNTER — TELEPHONE (OUTPATIENT)
Dept: ORTHOPEDICS | Age: 29
End: 2019-03-05

## 2019-03-08 ENCOUNTER — APPOINTMENT (OUTPATIENT)
Dept: CT IMAGING | Age: 29
End: 2019-03-08
Attending: EMERGENCY MEDICINE

## 2019-03-08 ENCOUNTER — APPOINTMENT (OUTPATIENT)
Dept: GENERAL RADIOLOGY | Age: 29
End: 2019-03-08
Attending: EMERGENCY MEDICINE

## 2019-03-08 ENCOUNTER — WALK IN (OUTPATIENT)
Dept: URGENT CARE | Age: 29
End: 2019-03-08

## 2019-03-08 ENCOUNTER — HOSPITAL ENCOUNTER (EMERGENCY)
Age: 29
Discharge: HOME | End: 2019-03-09
Attending: EMERGENCY MEDICINE

## 2019-03-08 VITALS
HEART RATE: 100 BPM | OXYGEN SATURATION: 100 % | HEIGHT: 64 IN | SYSTOLIC BLOOD PRESSURE: 109 MMHG | TEMPERATURE: 97.9 F | DIASTOLIC BLOOD PRESSURE: 60 MMHG | RESPIRATION RATE: 18 BRPM | BODY MASS INDEX: 23.49 KG/M2 | WEIGHT: 137.57 LBS

## 2019-03-08 VITALS
TEMPERATURE: 97.3 F | HEIGHT: 64 IN | HEART RATE: 65 BPM | DIASTOLIC BLOOD PRESSURE: 58 MMHG | RESPIRATION RATE: 16 BRPM | WEIGHT: 139.22 LBS | OXYGEN SATURATION: 99 % | BODY MASS INDEX: 23.77 KG/M2 | SYSTOLIC BLOOD PRESSURE: 93 MMHG

## 2019-03-08 DIAGNOSIS — M54.2 ACUTE NECK PAIN: ICD-10-CM
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
## 2019-03-08 DIAGNOSIS — G44.319 ACUTE POST-TRAUMATIC HEADACHE, NOT INTRACTABLE: ICD-10-CM

## 2019-03-08 DIAGNOSIS — R42 DIZZINESS: ICD-10-CM

## 2019-03-08 DIAGNOSIS — M54.50 ACUTE MIDLINE LOW BACK PAIN WITHOUT SCIATICA: ICD-10-CM

## 2019-03-08 DIAGNOSIS — M54.2 NECK PAIN: ICD-10-CM

## 2019-03-08 DIAGNOSIS — M54.9 PAIN, UPPER BACK: ICD-10-CM

## 2019-03-08 LAB
ALBUMIN SERPL-MCNC: 3.4 G/DL (ref 3.6–5.1)
ALBUMIN/GLOB SERPL: 0.9 {RATIO} (ref 1–2.4)
ALP SERPL-CCNC: 78 UNITS/L (ref 45–117)
ALT SERPL-CCNC: 26 UNITS/L
ANION GAP SERPL CALC-SCNC: 13 MMOL/L (ref 10–20)
AST SERPL-CCNC: 18 UNITS/L
B-HCG UR QL: NEGATIVE
BASOPHILS # BLD AUTO: 0 K/MCL (ref 0–0.3)
BASOPHILS NFR BLD AUTO: 1 %
BILIRUB SERPL-MCNC: 0.4 MG/DL (ref 0.2–1)
BUN SERPL-MCNC: 14 MG/DL (ref 6–20)
BUN/CREAT SERPL: 27 (ref 7–25)
CALCIUM SERPL-MCNC: 8.8 MG/DL (ref 8.4–10.2)
CHLORIDE SERPL-SCNC: 107 MMOL/L (ref 98–107)
CO2 SERPL-SCNC: 26 MMOL/L (ref 21–32)
CREAT SERPL-MCNC: 0.51 MG/DL (ref 0.51–0.95)
DIFFERENTIAL METHOD BLD: ABNORMAL
EOSINOPHIL # BLD AUTO: 0.1 K/MCL (ref 0.1–0.5)
EOSINOPHIL NFR SPEC: 1 %
ERYTHROCYTE [DISTWIDTH] IN BLOOD: 12.3 % (ref 11–15)
GLOBULIN SER-MCNC: 3.6 G/DL (ref 2–4)
GLUCOSE SERPL-MCNC: 103 MG/DL (ref 65–99)
HCT VFR BLD CALC: 34.6 % (ref 36–46.5)
HGB BLD-MCNC: 11.8 G/DL (ref 12–15.5)
IMM GRANULOCYTES # BLD AUTO: 0 K/MCL (ref 0–0.2)
IMM GRANULOCYTES NFR BLD: 0 %
LIPASE SERPL-CCNC: 91 UNITS/L (ref 73–393)
LYMPHOCYTES # BLD MANUAL: 2.8 K/MCL (ref 1–4.8)
LYMPHOCYTES NFR BLD MANUAL: 44 %
MCH RBC QN AUTO: 33.3 PG (ref 26–34)
MCHC RBC AUTO-ENTMCNC: 34.1 G/DL (ref 32–36.5)
MCV RBC AUTO: 97.7 FL (ref 78–100)
MONOCYTES # BLD MANUAL: 0.5 K/MCL (ref 0.3–0.9)
MONOCYTES NFR BLD MANUAL: 8 %
NEUTROPHILS # BLD: 3 K/MCL (ref 1.8–7.7)
NEUTROPHILS NFR BLD AUTO: 46 %
NRBC BLD MANUAL-RTO: 0 /100 WBC
PLATELET # BLD: 209 K/MCL (ref 140–450)
POTASSIUM SERPL-SCNC: 3.4 MMOL/L (ref 3.4–5.1)
PROT SERPL-MCNC: 7 G/DL (ref 6.4–8.2)
RBC # BLD: 3.54 MIL/MCL (ref 4–5.2)
SODIUM SERPL-SCNC: 143 MMOL/L (ref 135–145)
WBC # BLD: 6.4 K/MCL (ref 4.2–11)

## 2019-03-08 PROCEDURE — 70450 CT HEAD/BRAIN W/O DYE: CPT

## 2019-03-08 PROCEDURE — 83690 ASSAY OF LIPASE: CPT

## 2019-03-08 PROCEDURE — 80053 COMPREHEN METABOLIC PANEL: CPT

## 2019-03-08 PROCEDURE — 72125 CT NECK SPINE W/O DYE: CPT

## 2019-03-08 PROCEDURE — 72100 X-RAY EXAM L-S SPINE 2/3 VWS: CPT

## 2019-03-08 PROCEDURE — 70450 CT HEAD/BRAIN W/O DYE: CPT | Performed by: RADIOLOGY

## 2019-03-08 PROCEDURE — 72125 CT NECK SPINE W/O DYE: CPT | Performed by: RADIOLOGY

## 2019-03-08 PROCEDURE — 36415 COLL VENOUS BLD VENIPUNCTURE: CPT

## 2019-03-08 PROCEDURE — 85025 COMPLETE CBC W/AUTO DIFF WBC: CPT

## 2019-03-08 PROCEDURE — 81025 URINE PREGNANCY TEST: CPT | Performed by: EMERGENCY MEDICINE

## 2019-03-08 PROCEDURE — 99284 EMERGENCY DEPT VISIT MOD MDM: CPT

## 2019-03-08 PROCEDURE — 99213 OFFICE O/P EST LOW 20 MIN: CPT | Performed by: PHYSICIAN ASSISTANT

## 2019-03-08 PROCEDURE — 81003 URINALYSIS AUTO W/O SCOPE: CPT

## 2019-03-08 PROCEDURE — 10004651 HB RX, NO CHARGE ITEM: Performed by: EMERGENCY MEDICINE

## 2019-03-08 RX ORDER — ACETAMINOPHEN 325 MG/1
650 TABLET ORAL ONCE
Status: COMPLETED | OUTPATIENT
Start: 2019-03-08 | End: 2019-03-08

## 2019-03-08 RX ADMIN — ACETAMINOPHEN 650 MG: 325 TABLET ORAL at 22:25

## 2019-03-08 ASSESSMENT — ENCOUNTER SYMPTOMS
FATIGUE: 0
DIZZINESS: 1
HEADACHES: 1
VOMITING: 0
ABDOMINAL DISTENTION: 0
SHORTNESS OF BREATH: 0
BACK PAIN: 1
CHEST TIGHTNESS: 0
NAUSEA: 0
WEAKNESS: 0
NUMBNESS: 0
CHILLS: 0
ABDOMINAL PAIN: 1
FEVER: 0

## 2019-03-08 ASSESSMENT — PAIN SCALES - GENERAL
PAINLEVEL_OUTOF10: 6
PAINLEVEL_OUTOF10: 8

## 2019-03-09 LAB
APPEARANCE UR: NORMAL
BILIRUB UR QL STRIP: NEGATIVE
COLOR UR: YELLOW
GLUCOSE UR STRIP-MCNC: NEGATIVE MG/DL
HGB UR QL STRIP: NEGATIVE
KETONES UR STRIP-MCNC: NEGATIVE MG/DL
LEUKOCYTE ESTERASE UR QL STRIP: NEGATIVE
NITRITE UR QL STRIP: NEGATIVE
PH UR STRIP: 6 UNITS (ref 5–7)
PROT UR STRIP-MCNC: NEGATIVE MG/DL
SP GR UR STRIP: 1.01 (ref 1–1.03)
SPECIMEN SOURCE: NORMAL
UROBILINOGEN UR STRIP-MCNC: 0.2 MG/DL (ref 0–1)

## 2019-03-09 PROCEDURE — 72100 X-RAY EXAM L-S SPINE 2/3 VWS: CPT | Performed by: RADIOLOGY

## 2019-03-12 ENCOUNTER — OFFICE VISIT (OUTPATIENT)
Dept: FAMILY MEDICINE | Age: 29
End: 2019-03-12

## 2019-03-12 VITALS
BODY MASS INDEX: 23.53 KG/M2 | SYSTOLIC BLOOD PRESSURE: 96 MMHG | HEART RATE: 75 BPM | WEIGHT: 137.85 LBS | OXYGEN SATURATION: 99 % | DIASTOLIC BLOOD PRESSURE: 58 MMHG | TEMPERATURE: 97.7 F | HEIGHT: 64 IN

## 2019-03-12 DIAGNOSIS — K29.70 HELICOBACTER PYLORI GASTRITIS: ICD-10-CM

## 2019-03-12 DIAGNOSIS — M54.50 ACUTE BILATERAL LOW BACK PAIN WITHOUT SCIATICA: ICD-10-CM

## 2019-03-12 DIAGNOSIS — B96.81 HELICOBACTER PYLORI GASTRITIS: ICD-10-CM

## 2019-03-12 DIAGNOSIS — R63.0 DECREASED APPETITE: ICD-10-CM

## 2019-03-12 DIAGNOSIS — M54.2 ACUTE NECK PAIN: ICD-10-CM
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
## 2019-03-12 DIAGNOSIS — M54.2 NECK PAIN, BILATERAL: Primary | ICD-10-CM

## 2019-03-12 PROCEDURE — 99214 OFFICE O/P EST MOD 30 MIN: CPT | Performed by: FAMILY MEDICINE

## 2019-03-12 RX ORDER — LIDOCAINE 50 MG/G
OINTMENT TOPICAL 2 TIMES DAILY PRN
Qty: 35.44 G | Refills: 0 | Status: SHIPPED | OUTPATIENT
Start: 2019-03-12 | End: 2019-06-10 | Stop reason: ALTCHOICE

## 2019-03-12 RX ORDER — TIZANIDINE 4 MG/1
4 TABLET ORAL EVERY 8 HOURS PRN
Qty: 30 TABLET | Refills: 0 | Status: SHIPPED | OUTPATIENT
Start: 2019-03-12 | End: 2019-04-01 | Stop reason: SDUPTHER

## 2019-03-12 RX ORDER — DULOXETIN HYDROCHLORIDE 20 MG/1
20 CAPSULE, DELAYED RELEASE ORAL DAILY
Qty: 30 CAPSULE | Refills: 3 | Status: SHIPPED | OUTPATIENT
Start: 2019-03-12 | End: 2019-06-10 | Stop reason: ALTCHOICE

## 2019-03-20 ENCOUNTER — TELEPHONE (OUTPATIENT)
Dept: FAMILY MEDICINE | Age: 29
End: 2019-03-20

## 2019-03-22 ENCOUNTER — LAB SERVICES (OUTPATIENT)
Dept: LAB | Age: 29
End: 2019-03-22

## 2019-03-22 DIAGNOSIS — R63.0 DECREASED APPETITE: ICD-10-CM

## 2019-03-22 DIAGNOSIS — B96.81 HELICOBACTER PYLORI GASTRITIS: ICD-10-CM

## 2019-03-22 DIAGNOSIS — K29.70 HELICOBACTER PYLORI GASTRITIS: ICD-10-CM

## 2019-03-22 PROCEDURE — 87338 HPYLORI STOOL AG IA: CPT | Performed by: INTERNAL MEDICINE

## 2019-03-25 ENCOUNTER — TELEPHONE (OUTPATIENT)
Dept: ORTHOPEDICS | Age: 29
End: 2019-03-25

## 2019-03-25 LAB — H PYLORI AG STL QL IA: NEGATIVE

## 2019-03-26 ENCOUNTER — OFFICE VISIT (OUTPATIENT)
Dept: FAMILY MEDICINE | Age: 29
End: 2019-03-26

## 2019-03-26 ENCOUNTER — TELEPHONE (OUTPATIENT)
Dept: FAMILY MEDICINE | Age: 29
End: 2019-03-26

## 2019-03-26 DIAGNOSIS — B00.1 COLD SORE: ICD-10-CM

## 2019-03-26 DIAGNOSIS — M54.9 UPPER BACK PAIN ON LEFT SIDE: ICD-10-CM
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
## 2019-03-26 DIAGNOSIS — S20.212D CHEST WALL CONTUSION, LEFT, SUBSEQUENT ENCOUNTER: Primary | ICD-10-CM

## 2019-03-26 PROCEDURE — 99214 OFFICE O/P EST MOD 30 MIN: CPT | Performed by: FAMILY MEDICINE

## 2019-03-26 RX ORDER — NAPROXEN 500 MG/1
500 TABLET ORAL 2 TIMES DAILY WITH MEALS
Qty: 20 TABLET | Refills: 0 | Status: SHIPPED | OUTPATIENT
Start: 2019-03-26 | End: 2019-04-25

## 2019-03-26 RX ORDER — VALACYCLOVIR HYDROCHLORIDE 1 G/1
2000 TABLET, FILM COATED ORAL 2 TIMES DAILY
Qty: 4 TABLET | Refills: 1 | Status: SHIPPED | OUTPATIENT
Start: 2019-03-26 | End: 2019-03-27

## 2019-03-26 ASSESSMENT — PATIENT HEALTH QUESTIONNAIRE - PHQ9
SUM OF ALL RESPONSES TO PHQ9 QUESTIONS 1 AND 2: 0
SUM OF ALL RESPONSES TO PHQ9 QUESTIONS 1 AND 2: 0
1. LITTLE INTEREST OR PLEASURE IN DOING THINGS: NOT AT ALL
2. FEELING DOWN, DEPRESSED OR HOPELESS: NOT AT ALL

## 2019-03-26 ASSESSMENT — PAIN SCALES - GENERAL: PAINLEVEL: 5-6

## 2019-04-01 ENCOUNTER — OFFICE VISIT (OUTPATIENT)
Dept: ORTHOPEDICS | Age: 29
End: 2019-04-01

## 2019-04-01 DIAGNOSIS — M54.2 NECK PAIN, BILATERAL: ICD-10-CM

## 2019-04-01 DIAGNOSIS — M25.512 ACUTE PAIN OF LEFT SHOULDER: Primary | ICD-10-CM

## 2019-04-01 DIAGNOSIS — M54.12 CERVICAL RADICULOPATHY: ICD-10-CM

## 2019-04-01 PROCEDURE — 99213 OFFICE O/P EST LOW 20 MIN: CPT | Performed by: PHYSICIAN ASSISTANT

## 2019-04-01 RX ORDER — TIZANIDINE 4 MG/1
4 TABLET ORAL EVERY 8 HOURS PRN
Qty: 30 TABLET | Refills: 0 | Status: SHIPPED | OUTPATIENT
Start: 2019-04-01 | End: 2019-06-10 | Stop reason: ALTCHOICE

## 2019-04-10 ENCOUNTER — OFFICE VISIT (OUTPATIENT)
Dept: NEUROLOGY | Age: 29
End: 2019-04-10

## 2019-04-10 VITALS
BODY MASS INDEX: 23.47 KG/M2 | HEART RATE: 74 BPM | SYSTOLIC BLOOD PRESSURE: 102 MMHG | WEIGHT: 137.46 LBS | DIASTOLIC BLOOD PRESSURE: 58 MMHG | HEIGHT: 64 IN

## 2019-04-10 DIAGNOSIS — G44.059 SUNCT (SHORT UNILATERAL NEURALGIFORM HEADACHE, CONJUNCTIVAL INJ/TEAR): Primary | ICD-10-CM

## 2019-04-10 PROCEDURE — 99213 OFFICE O/P EST LOW 20 MIN: CPT | Performed by: PSYCHIATRY & NEUROLOGY

## 2019-04-10 RX ORDER — TOPIRAMATE 100 MG/1
100 CAPSULE, EXTENDED RELEASE ORAL NIGHTLY
Qty: 30 CAPSULE | Refills: 2 | Status: SHIPPED | OUTPATIENT
Start: 2019-04-10 | End: 2019-07-15 | Stop reason: SDUPTHER

## 2019-04-16 ENCOUNTER — HOSPITAL ENCOUNTER (OUTPATIENT)
Dept: MRI IMAGING | Age: 29
Discharge: HOME | End: 2019-04-16
Attending: PHYSICIAN ASSISTANT

## 2019-04-16 DIAGNOSIS — M54.12 CERVICAL RADICULOPATHY: ICD-10-CM

## 2019-04-16 DIAGNOSIS — M25.512 ACUTE PAIN OF LEFT SHOULDER: ICD-10-CM

## 2019-04-16 PROCEDURE — 73221 MRI JOINT UPR EXTREM W/O DYE: CPT | Performed by: RADIOLOGY

## 2019-04-16 PROCEDURE — 73221 MRI JOINT UPR EXTREM W/O DYE: CPT

## 2019-04-16 PROCEDURE — 72141 MRI NECK SPINE W/O DYE: CPT | Performed by: RADIOLOGY

## 2019-04-16 PROCEDURE — 72141 MRI NECK SPINE W/O DYE: CPT

## 2019-05-17 ENCOUNTER — TELEPHONE (OUTPATIENT)
Dept: FAMILY MEDICINE | Age: 29
End: 2019-05-17

## 2019-06-10 ENCOUNTER — OFFICE VISIT (OUTPATIENT)
Dept: FAMILY MEDICINE | Age: 29
End: 2019-06-10

## 2019-06-10 VITALS
RESPIRATION RATE: 15 BRPM | SYSTOLIC BLOOD PRESSURE: 96 MMHG | DIASTOLIC BLOOD PRESSURE: 62 MMHG | HEIGHT: 64 IN | WEIGHT: 138.67 LBS | HEART RATE: 68 BPM | BODY MASS INDEX: 23.67 KG/M2

## 2019-06-10 DIAGNOSIS — M54.42 ACUTE LEFT-SIDED LOW BACK PAIN WITH LEFT-SIDED SCIATICA: Primary | ICD-10-CM
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
## 2019-06-10 PROCEDURE — 99214 OFFICE O/P EST MOD 30 MIN: CPT | Performed by: FAMILY MEDICINE

## 2019-06-10 RX ORDER — GABAPENTIN 300 MG/1
300 CAPSULE ORAL NIGHTLY
Qty: 30 CAPSULE | Refills: 3 | Status: SHIPPED | OUTPATIENT
Start: 2019-06-10

## 2019-06-11 ENCOUNTER — TELEPHONE (OUTPATIENT)
Dept: FAMILY MEDICINE | Age: 29
End: 2019-06-11

## 2019-06-13 ENCOUNTER — OFFICE VISIT (OUTPATIENT)
Dept: ORTHOPEDICS | Age: 29
End: 2019-06-13
Attending: FAMILY MEDICINE

## 2019-06-13 DIAGNOSIS — M54.16 LUMBAR RADICULOPATHY: Primary | ICD-10-CM

## 2019-06-13 PROCEDURE — 99202 OFFICE O/P NEW SF 15 MIN: CPT | Performed by: ORTHOPAEDIC SURGERY

## 2019-06-24 ENCOUNTER — HOSPITAL ENCOUNTER (OUTPATIENT)
Dept: MRI IMAGING | Age: 29
Discharge: HOME | End: 2019-06-24
Attending: ORTHOPAEDIC SURGERY

## 2019-06-24 DIAGNOSIS — M54.16 LUMBAR RADICULOPATHY: ICD-10-CM

## 2019-06-24 PROCEDURE — 72148 MRI LUMBAR SPINE W/O DYE: CPT

## 2019-06-24 PROCEDURE — 72148 MRI LUMBAR SPINE W/O DYE: CPT | Performed by: RADIOLOGY

## 2019-06-27 ENCOUNTER — APPOINTMENT (OUTPATIENT)
Dept: FAMILY MEDICINE | Age: 29
End: 2019-06-27

## 2019-07-02 ENCOUNTER — HOSPITAL ENCOUNTER (OUTPATIENT)
Dept: REHABILITATION | Age: 29
Discharge: STILL ADMITTED OR ADMINISTRATIVE | End: 2019-07-02
Attending: ORTHOPAEDIC SURGERY

## 2019-07-02 ENCOUNTER — TELEPHONE (OUTPATIENT)
Dept: ORTHOPEDICS | Age: 29
End: 2019-07-02

## 2019-07-02 DIAGNOSIS — M54.16 LUMBAR RADICULOPATHY: Primary | ICD-10-CM

## 2019-07-02 DIAGNOSIS — M43.17 SPONDYLOLISTHESIS AT L5-S1 LEVEL: ICD-10-CM

## 2019-07-02 DIAGNOSIS — M54.16 LUMBAR RADICULOPATHY: ICD-10-CM

## 2019-07-02 PROCEDURE — 97110 THERAPEUTIC EXERCISES: CPT | Performed by: PHYSICAL THERAPIST

## 2019-07-02 PROCEDURE — 97162 PT EVAL MOD COMPLEX 30 MIN: CPT | Performed by: PHYSICAL THERAPIST

## 2019-07-02 PROCEDURE — 97530 THERAPEUTIC ACTIVITIES: CPT | Performed by: PHYSICAL THERAPIST

## 2019-07-02 PROCEDURE — 10004173 HB COUNTER-THERAPY VISIT PT: Performed by: PHYSICAL THERAPIST

## 2019-07-09 ENCOUNTER — HOSPITAL ENCOUNTER (OUTPATIENT)
Dept: REHABILITATION | Age: 29
Discharge: STILL ADMITTED OR ADMINISTRATIVE | End: 2019-07-09
Attending: ORTHOPAEDIC SURGERY

## 2019-07-09 PROCEDURE — 97110 THERAPEUTIC EXERCISES: CPT | Performed by: PHYSICAL THERAPY ASSISTANT

## 2019-07-09 PROCEDURE — 97140 MANUAL THERAPY 1/> REGIONS: CPT | Performed by: PHYSICAL THERAPY ASSISTANT

## 2019-07-09 PROCEDURE — 10004173 HB COUNTER-THERAPY VISIT PT: Performed by: PHYSICAL THERAPY ASSISTANT

## 2019-07-15 ENCOUNTER — OFFICE VISIT (OUTPATIENT)
Dept: NEUROLOGY | Age: 29
End: 2019-07-15

## 2019-07-15 ENCOUNTER — HOSPITAL ENCOUNTER (OUTPATIENT)
Dept: REHABILITATION | Age: 29
Discharge: STILL ADMITTED OR ADMINISTRATIVE | End: 2019-07-15
Attending: ORTHOPAEDIC SURGERY

## 2019-07-15 VITALS
HEART RATE: 70 BPM | SYSTOLIC BLOOD PRESSURE: 118 MMHG | DIASTOLIC BLOOD PRESSURE: 76 MMHG | HEIGHT: 64 IN | BODY MASS INDEX: 23.45 KG/M2 | WEIGHT: 137.35 LBS

## 2019-07-15 DIAGNOSIS — G44.059 SUNCT (SHORT UNILATERAL NEURALGIFORM HEADACHE, CONJUNCTIVAL INJ/TEAR): ICD-10-CM

## 2019-07-15 PROCEDURE — 99213 OFFICE O/P EST LOW 20 MIN: CPT | Performed by: PSYCHIATRY & NEUROLOGY

## 2019-07-15 PROCEDURE — 10004173 HB COUNTER-THERAPY VISIT PT: Performed by: PHYSICAL THERAPIST

## 2019-07-15 PROCEDURE — 97110 THERAPEUTIC EXERCISES: CPT | Performed by: PHYSICAL THERAPIST

## 2019-07-15 PROCEDURE — 97112 NEUROMUSCULAR REEDUCATION: CPT | Performed by: PHYSICAL THERAPIST

## 2019-07-15 PROCEDURE — 97140 MANUAL THERAPY 1/> REGIONS: CPT | Performed by: PHYSICAL THERAPIST

## 2019-07-15 RX ORDER — TOPIRAMATE 200 MG/1
200 CAPSULE, EXTENDED RELEASE ORAL NIGHTLY
Qty: 30 CAPSULE | Refills: 5 | Status: SHIPPED | OUTPATIENT
Start: 2019-07-15

## 2019-07-16 ENCOUNTER — HOSPITAL ENCOUNTER (OUTPATIENT)
Dept: REHABILITATION | Age: 29
Discharge: STILL ADMITTED OR ADMINISTRATIVE | End: 2019-07-16
Attending: ORTHOPAEDIC SURGERY

## 2019-07-16 PROCEDURE — 97112 NEUROMUSCULAR REEDUCATION: CPT | Performed by: PHYSICAL THERAPY ASSISTANT

## 2019-07-16 PROCEDURE — 97110 THERAPEUTIC EXERCISES: CPT | Performed by: PHYSICAL THERAPY ASSISTANT

## 2019-07-16 PROCEDURE — 10004173 HB COUNTER-THERAPY VISIT PT: Performed by: PHYSICAL THERAPY ASSISTANT

## 2019-07-17 ENCOUNTER — IMAGING SERVICES (OUTPATIENT)
Dept: CT IMAGING | Age: 29
End: 2019-07-17
Attending: PHYSICIAN ASSISTANT

## 2019-07-17 DIAGNOSIS — M54.16 LUMBAR RADICULOPATHY: ICD-10-CM

## 2019-07-17 DIAGNOSIS — M43.17 SPONDYLOLISTHESIS AT L5-S1 LEVEL: ICD-10-CM

## 2019-07-17 PROCEDURE — 72131 CT LUMBAR SPINE W/O DYE: CPT | Performed by: RADIOLOGY

## 2019-07-22 ENCOUNTER — HOSPITAL ENCOUNTER (OUTPATIENT)
Dept: REHABILITATION | Age: 29
Discharge: STILL ADMITTED OR ADMINISTRATIVE | End: 2019-07-22
Attending: ORTHOPAEDIC SURGERY

## 2019-07-22 PROCEDURE — 97112 NEUROMUSCULAR REEDUCATION: CPT | Performed by: PHYSICAL THERAPY ASSISTANT

## 2019-07-22 PROCEDURE — 10004173 HB COUNTER-THERAPY VISIT PT: Performed by: PHYSICAL THERAPY ASSISTANT

## 2019-07-22 PROCEDURE — 97110 THERAPEUTIC EXERCISES: CPT | Performed by: PHYSICAL THERAPY ASSISTANT

## 2019-07-23 ENCOUNTER — HOSPITAL ENCOUNTER (OUTPATIENT)
Dept: REHABILITATION | Age: 29
Discharge: STILL ADMITTED OR ADMINISTRATIVE | End: 2019-07-23
Attending: ORTHOPAEDIC SURGERY

## 2019-07-23 ENCOUNTER — TELEPHONE (OUTPATIENT)
Dept: ORTHOPEDICS | Age: 29
End: 2019-07-23

## 2019-07-23 PROCEDURE — 97530 THERAPEUTIC ACTIVITIES: CPT | Performed by: PHYSICAL THERAPY ASSISTANT

## 2019-07-23 PROCEDURE — 97110 THERAPEUTIC EXERCISES: CPT | Performed by: PHYSICAL THERAPY ASSISTANT

## 2019-07-23 PROCEDURE — 10004173 HB COUNTER-THERAPY VISIT PT: Performed by: PHYSICAL THERAPY ASSISTANT

## 2019-07-25 ENCOUNTER — TELEPHONE (OUTPATIENT)
Dept: ORTHOPEDICS | Age: 29
End: 2019-07-25

## 2019-07-29 ENCOUNTER — HOSPITAL ENCOUNTER (OUTPATIENT)
Dept: REHABILITATION | Age: 29
Discharge: STILL ADMITTED OR ADMINISTRATIVE | End: 2019-07-29
Attending: ORTHOPAEDIC SURGERY

## 2019-07-29 PROCEDURE — 97110 THERAPEUTIC EXERCISES: CPT | Performed by: PHYSICAL THERAPY ASSISTANT

## 2019-07-29 PROCEDURE — 10004173 HB COUNTER-THERAPY VISIT PT: Performed by: PHYSICAL THERAPY ASSISTANT

## 2019-07-30 ENCOUNTER — TELEPHONE (OUTPATIENT)
Dept: FAMILY MEDICINE | Age: 29
End: 2019-07-30

## 2019-07-30 ENCOUNTER — HOSPITAL ENCOUNTER (OUTPATIENT)
Dept: REHABILITATION | Age: 29
Discharge: STILL ADMITTED OR ADMINISTRATIVE | End: 2019-07-30
Attending: ORTHOPAEDIC SURGERY

## 2019-07-30 PROCEDURE — 10004173 HB COUNTER-THERAPY VISIT PT: Performed by: PHYSICAL THERAPY ASSISTANT

## 2019-07-30 PROCEDURE — 97110 THERAPEUTIC EXERCISES: CPT | Performed by: PHYSICAL THERAPY ASSISTANT

## 2019-08-06 ENCOUNTER — HOSPITAL ENCOUNTER (OUTPATIENT)
Dept: REHABILITATION | Age: 29
Discharge: STILL ADMITTED OR ADMINISTRATIVE | End: 2019-08-06
Attending: ORTHOPAEDIC SURGERY

## 2019-08-06 PROCEDURE — 10004173 HB COUNTER-THERAPY VISIT PT: Performed by: PHYSICAL THERAPIST

## 2019-08-06 PROCEDURE — 97112 NEUROMUSCULAR REEDUCATION: CPT | Performed by: PHYSICAL THERAPIST

## 2019-08-06 PROCEDURE — 97110 THERAPEUTIC EXERCISES: CPT | Performed by: PHYSICAL THERAPIST

## 2019-08-08 RX ORDER — ERGOCALCIFEROL 1.25 MG/1
CAPSULE ORAL
Qty: 4 CAPSULE | Refills: 0 | Status: SHIPPED | OUTPATIENT
Start: 2019-08-08

## 2019-08-12 ENCOUNTER — TELEPHONE (OUTPATIENT)
Dept: PAIN MANAGEMENT | Age: 29
End: 2019-08-12

## 2019-08-12 ENCOUNTER — OFFICE VISIT (OUTPATIENT)
Dept: ORTHOPEDICS | Age: 29
End: 2019-08-12

## 2019-08-12 DIAGNOSIS — M43.17 SPONDYLOLISTHESIS AT L5-S1 LEVEL: Primary | ICD-10-CM

## 2019-08-12 DIAGNOSIS — M54.16 LUMBAR RADICULOPATHY: ICD-10-CM

## 2019-08-12 PROCEDURE — 99213 OFFICE O/P EST LOW 20 MIN: CPT | Performed by: ORTHOPAEDIC SURGERY

## 2019-08-13 ENCOUNTER — PREP FOR CASE (OUTPATIENT)
Dept: PAIN MANAGEMENT | Age: 29
End: 2019-08-13

## 2019-08-13 ENCOUNTER — HOSPITAL ENCOUNTER (OUTPATIENT)
Dept: REHABILITATION | Age: 29
Discharge: STILL ADMITTED OR ADMINISTRATIVE | End: 2019-08-13
Attending: ORTHOPAEDIC SURGERY

## 2019-08-13 DIAGNOSIS — M54.16 LUMBAR RADICULOPATHY: Primary | ICD-10-CM

## 2019-08-13 PROCEDURE — 10004173 HB COUNTER-THERAPY VISIT PT: Performed by: PHYSICAL THERAPIST

## 2019-08-13 PROCEDURE — 97112 NEUROMUSCULAR REEDUCATION: CPT | Performed by: PHYSICAL THERAPIST

## 2019-08-13 PROCEDURE — 97140 MANUAL THERAPY 1/> REGIONS: CPT | Performed by: PHYSICAL THERAPIST

## 2019-08-13 PROCEDURE — 97530 THERAPEUTIC ACTIVITIES: CPT | Performed by: PHYSICAL THERAPIST

## 2019-08-19 ENCOUNTER — APPOINTMENT (OUTPATIENT)
Dept: REHABILITATION | Age: 29
End: 2019-08-19
Attending: ORTHOPAEDIC SURGERY

## 2019-08-20 ENCOUNTER — HOSPITAL ENCOUNTER (OUTPATIENT)
Dept: REHABILITATION | Age: 29
End: 2019-08-20
Attending: ORTHOPAEDIC SURGERY

## 2019-08-25 DIAGNOSIS — R52 GENERALIZED PAIN: Primary | ICD-10-CM

## 2019-08-26 ENCOUNTER — HOSPITAL ENCOUNTER (OUTPATIENT)
Dept: REHABILITATION | Age: 29
Discharge: STILL ADMITTED OR ADMINISTRATIVE | End: 2019-08-26
Attending: ORTHOPAEDIC SURGERY

## 2019-08-26 PROCEDURE — 10004173 HB COUNTER-THERAPY VISIT PT: Performed by: PHYSICAL THERAPY ASSISTANT

## 2019-08-26 PROCEDURE — 97110 THERAPEUTIC EXERCISES: CPT | Performed by: PHYSICAL THERAPY ASSISTANT

## 2019-08-27 ENCOUNTER — HOSPITAL ENCOUNTER (OUTPATIENT)
Dept: REHABILITATION | Age: 29
Discharge: STILL ADMITTED OR ADMINISTRATIVE | End: 2019-08-27
Attending: ORTHOPAEDIC SURGERY

## 2019-08-27 ENCOUNTER — HOSPITAL ENCOUNTER (OUTPATIENT)
Dept: PAIN MANAGEMENT | Age: 29
Discharge: HOME | End: 2019-08-27
Attending: PAIN MEDICINE

## 2019-08-27 ENCOUNTER — HOSPITAL ENCOUNTER (OUTPATIENT)
Dept: GENERAL RADIOLOGY | Age: 29
Discharge: HOME | End: 2019-08-27
Attending: PAIN MEDICINE

## 2019-08-27 VITALS
HEIGHT: 64 IN | TEMPERATURE: 97.8 F | BODY MASS INDEX: 23.39 KG/M2 | SYSTOLIC BLOOD PRESSURE: 100 MMHG | DIASTOLIC BLOOD PRESSURE: 52 MMHG | OXYGEN SATURATION: 100 % | RESPIRATION RATE: 16 BRPM | WEIGHT: 137 LBS | HEART RATE: 62 BPM

## 2019-08-27 DIAGNOSIS — R52 GENERALIZED PAIN: ICD-10-CM

## 2019-08-27 PROCEDURE — 10002800 HB RX 250 W HCPCS

## 2019-08-27 PROCEDURE — 10004173 HB COUNTER-THERAPY VISIT PT: Performed by: PHYSICAL THERAPIST

## 2019-08-27 PROCEDURE — 10005267 HB INJECT FORAMEN INITIAL BILATERAL: Performed by: PAIN MEDICINE

## 2019-08-27 PROCEDURE — 64483 NJX AA&/STRD TFRM EPI L/S 1: CPT | Performed by: PAIN MEDICINE

## 2019-08-27 PROCEDURE — 97530 THERAPEUTIC ACTIVITIES: CPT | Performed by: PHYSICAL THERAPIST

## 2019-08-27 PROCEDURE — 10004187 HB COUNTER-VISIT CENSUS, OUTPATIENT: Performed by: PAIN MEDICINE

## 2019-08-27 RX ORDER — LIDOCAINE HYDROCHLORIDE 10 MG/ML
INJECTION, SOLUTION EPIDURAL; INFILTRATION; INTRACAUDAL; PERINEURAL
Status: DISCONTINUED
Start: 2019-08-27 | End: 2019-08-29 | Stop reason: HOSPADM

## 2019-08-27 RX ORDER — LIDOCAINE HYDROCHLORIDE 10 MG/ML
5 INJECTION, SOLUTION EPIDURAL; INFILTRATION; INTRACAUDAL; PERINEURAL ONCE
Status: DISCONTINUED | OUTPATIENT
Start: 2019-08-27 | End: 2019-08-27

## 2019-08-27 RX ORDER — TRIAMCINOLONE ACETONIDE 40 MG/ML
INJECTION, SUSPENSION INTRA-ARTICULAR; INTRAMUSCULAR
Status: COMPLETED
Start: 2019-08-27 | End: 2019-08-27

## 2019-08-27 RX ORDER — TRIAMCINOLONE ACETONIDE 40 MG/ML
80 INJECTION, SUSPENSION INTRA-ARTICULAR; INTRAMUSCULAR ONCE
Status: COMPLETED | OUTPATIENT
Start: 2019-08-27 | End: 2019-08-27

## 2019-08-27 RX ORDER — TRIAMCINOLONE ACETONIDE 40 MG/ML
80 INJECTION, SUSPENSION INTRA-ARTICULAR; INTRAMUSCULAR ONCE
Status: DISCONTINUED | OUTPATIENT
Start: 2019-08-27 | End: 2019-08-27

## 2019-08-27 RX ORDER — SODIUM CHLORIDE, SODIUM LACTATE, POTASSIUM CHLORIDE, CALCIUM CHLORIDE 600; 310; 30; 20 MG/100ML; MG/100ML; MG/100ML; MG/100ML
INJECTION, SOLUTION INTRAVENOUS CONTINUOUS
Status: DISCONTINUED | OUTPATIENT
Start: 2019-08-27 | End: 2019-08-29 | Stop reason: HOSPADM

## 2019-08-27 RX ORDER — LIDOCAINE HYDROCHLORIDE 10 MG/ML
5 INJECTION, SOLUTION EPIDURAL; INFILTRATION; INTRACAUDAL; PERINEURAL ONCE
Status: DISCONTINUED | OUTPATIENT
Start: 2019-08-27 | End: 2019-08-29 | Stop reason: HOSPADM

## 2019-08-27 RX ORDER — 0.9 % SODIUM CHLORIDE 0.9 %
2 VIAL (ML) INJECTION EVERY 12 HOURS SCHEDULED
Status: DISCONTINUED | OUTPATIENT
Start: 2019-08-27 | End: 2019-08-29 | Stop reason: HOSPADM

## 2019-08-27 RX ORDER — SODIUM CHLORIDE, SODIUM LACTATE, POTASSIUM CHLORIDE, CALCIUM CHLORIDE 600; 310; 30; 20 MG/100ML; MG/100ML; MG/100ML; MG/100ML
INJECTION, SOLUTION INTRAVENOUS CONTINUOUS
Status: CANCELLED | OUTPATIENT
Start: 2019-08-27

## 2019-08-27 RX ORDER — 0.9 % SODIUM CHLORIDE 0.9 %
2 VIAL (ML) INJECTION EVERY 12 HOURS SCHEDULED
Status: CANCELLED | OUTPATIENT
Start: 2019-08-27

## 2019-08-27 RX ADMIN — TRIAMCINOLONE ACETONIDE 80 MG: 40 INJECTION, SUSPENSION INTRA-ARTICULAR; INTRAMUSCULAR at 16:06

## 2019-08-27 ASSESSMENT — ENCOUNTER SYMPTOMS
CONSTIPATION: 0
DIAPHORESIS: 0
BACK PAIN: 1
CHILLS: 0
FEVER: 0
WEAKNESS: 0
ROS GI COMMENTS: DENIES LOSS OF BOWEL CONTROL
NUMBNESS: 0

## 2019-08-27 ASSESSMENT — PAIN SCALES - GENERAL
PAIN_LEVEL_WITH_ACTIVITY: 9
PAIN_LEVEL_AT_REST: 6
PAIN_LEVEL_AT_REST: 6

## 2019-09-24 ENCOUNTER — TELEPHONE (OUTPATIENT)
Dept: FAMILY MEDICINE | Age: 29
End: 2019-09-24

## 2019-10-11 DIAGNOSIS — G44.059 SUNCT (SHORT UNILATERAL NEURALGIFORM HEADACHE, CONJUNCTIVAL INJ/TEAR): ICD-10-CM

## 2019-10-14 RX ORDER — TOPIRAMATE 100 MG/1
CAPSULE, EXTENDED RELEASE ORAL
Qty: 30 CAPSULE | Refills: 0 | Status: SHIPPED | OUTPATIENT
Start: 2019-10-14

## 2019-10-16 ENCOUNTER — TELEPHONE (OUTPATIENT)
Dept: FAMILY MEDICINE | Age: 29
End: 2019-10-16

## 2019-11-14 ENCOUNTER — TELEPHONE (OUTPATIENT)
Dept: FAMILY MEDICINE | Age: 29
End: 2019-11-14

## 2021-05-25 VITALS
WEIGHT: 139.11 LBS | DIASTOLIC BLOOD PRESSURE: 66 MMHG | OXYGEN SATURATION: 97 % | SYSTOLIC BLOOD PRESSURE: 106 MMHG | HEART RATE: 86 BPM | BODY MASS INDEX: 23.75 KG/M2 | RESPIRATION RATE: 14 BRPM | HEIGHT: 64 IN | TEMPERATURE: 97.6 F

## 2023-12-08 ENCOUNTER — ANCILLARY PROCEDURE (OUTPATIENT)
Dept: GENERAL RADIOLOGY | Facility: CLINIC | Age: 33
End: 2023-12-08
Attending: NURSE PRACTITIONER
Payer: COMMERCIAL

## 2023-12-08 ENCOUNTER — OFFICE VISIT (OUTPATIENT)
Dept: URGENT CARE | Facility: URGENT CARE | Age: 33
End: 2023-12-08
Payer: COMMERCIAL

## 2023-12-08 VITALS
SYSTOLIC BLOOD PRESSURE: 116 MMHG | HEART RATE: 70 BPM | OXYGEN SATURATION: 99 % | DIASTOLIC BLOOD PRESSURE: 81 MMHG | TEMPERATURE: 98.5 F

## 2023-12-08 DIAGNOSIS — M25.561 ACUTE PAIN OF RIGHT KNEE: ICD-10-CM

## 2023-12-08 DIAGNOSIS — M25.561 CHRONIC PAIN OF RIGHT KNEE: Primary | ICD-10-CM

## 2023-12-08 DIAGNOSIS — G89.29 CHRONIC PAIN OF RIGHT KNEE: Primary | ICD-10-CM

## 2023-12-08 PROCEDURE — 99204 OFFICE O/P NEW MOD 45 MIN: CPT

## 2023-12-08 PROCEDURE — 73562 X-RAY EXAM OF KNEE 3: CPT | Mod: TC | Performed by: RADIOLOGY

## 2023-12-08 RX ORDER — SWAB
1 SWAB, NON-MEDICATED MISCELLANEOUS DAILY
Qty: 30 CAPSULE | Refills: 0 | Status: SHIPPED | OUTPATIENT
Start: 2023-12-08 | End: 2024-01-07

## 2023-12-08 RX ORDER — IBUPROFEN 200 MG
600 TABLET ORAL EVERY 6 HOURS PRN
Qty: 80 TABLET | Refills: 0 | Status: SHIPPED | OUTPATIENT
Start: 2023-12-08 | End: 2023-12-28

## 2023-12-08 RX ORDER — IBUPROFEN 200 MG
600 TABLET ORAL ONCE
Status: COMPLETED | OUTPATIENT
Start: 2023-12-08 | End: 2023-12-11

## 2023-12-09 NOTE — PROGRESS NOTES
Chief Complaint   Patient presents with    Urgent Care     Painful legs x 1 week     [employer]  used for entire visit.    ICD-10-CM    1. Chronic pain of right knee  M25.561 XR Knee Right 3 Views    G89.29 ibuprofen (ADVIL/MOTRIN) tablet 600 mg     calcium carbonate (OS-ALAN) 1500 (600 Ca) MG tablet     vitamin D3 (CHOLECALCIFEROL) 10 MCG (400 UNIT) capsule     ibuprofen (ADVIL/MOTRIN) 200 MG tablet     Orthopedic  Referral      Answered the many questions patient had regarding the knee, orthopedic referral, seeing primary care and weight loss..  Referred her to orthopedics.  She will take ibuprofen with food and use ice for comfort.  Instructed her to continue to work on her weight loss as this will definitely contribute to progression of knee issues.    46 minutes spent by me on the date of the encounter doing chart review, history and exam, documentation and further activities per the note    Xray - Reviewed and interpreted by me.  Right knee x-ray shows joint narrowing likely arthritic changes but no acute fractures or dislocations.    Subjective     Aliyah Quach is an 33 year old female who presents to clinic today for pain in her right knee that has been intermittent for over a year recently feeling worse.  She denies any trauma recently.  She reports a fall about a year ago but is unsure if this is related.    She is also requesting prescriptions for calcium supplements.      Objective    /81 (BP Location: Right arm, Patient Position: Sitting, Cuff Size: Adult Large)   Pulse 70   Temp 98.5  F (36.9  C) (Tympanic)   SpO2 99%   Breastfeeding No   Nurses notes and VS have been reviewed.    Physical Exam       GENERAL APPEARANCE: alert, mild distress, and over weight     MS: extremities normal- no gross deformities noted; normal muscle tone.  Right knee exam is normal other than perhaps a small effusion just above the patella     SKIN: no suspicious lesions or rashes     NEURO:  Normal strength and tone, mentation intact and speech normal     PSYCH: normal thought process; no significant mood disturbance      CHET Curtis, CNP  [facility]    The use of Dragon/[employer] dictation services may have been used to construct the content in this note; any grammatical or spelling errors are non-intentional. Please contact the author of this note directly if you are in need of any clarification.

## 2023-12-11 RX ADMIN — Medication 600 MG: at 11:41

## 2024-08-19 ENCOUNTER — OFFICE VISIT (OUTPATIENT)
Dept: URGENT CARE | Facility: URGENT CARE | Age: 34
End: 2024-08-19
Payer: COMMERCIAL

## 2024-08-19 VITALS
WEIGHT: 239.4 LBS | SYSTOLIC BLOOD PRESSURE: 126 MMHG | RESPIRATION RATE: 18 BRPM | DIASTOLIC BLOOD PRESSURE: 84 MMHG | TEMPERATURE: 97.7 F | OXYGEN SATURATION: 99 % | HEART RATE: 69 BPM

## 2024-08-19 DIAGNOSIS — M54.6 ACUTE MIDLINE THORACIC BACK PAIN: Primary | ICD-10-CM

## 2024-08-19 DIAGNOSIS — Z13.0 SCREENING, IRON DEFICIENCY ANEMIA: ICD-10-CM

## 2024-08-19 DIAGNOSIS — Z13.21 ENCOUNTER FOR VITAMIN DEFICIENCY SCREENING: ICD-10-CM

## 2024-08-19 LAB
ALBUMIN SERPL BCG-MCNC: 4 G/DL (ref 3.5–5.2)
ALBUMIN UR-MCNC: NEGATIVE MG/DL
ALP SERPL-CCNC: 99 U/L (ref 40–150)
ALT SERPL W P-5'-P-CCNC: 10 U/L (ref 0–50)
ANION GAP SERPL CALCULATED.3IONS-SCNC: 11 MMOL/L (ref 7–15)
APPEARANCE UR: CLEAR
AST SERPL W P-5'-P-CCNC: 18 U/L (ref 0–45)
BASOPHILS # BLD AUTO: 0 10E3/UL (ref 0–0.2)
BASOPHILS NFR BLD AUTO: 1 %
BILIRUB SERPL-MCNC: 0.2 MG/DL
BILIRUB UR QL STRIP: NEGATIVE
BUN SERPL-MCNC: 7.8 MG/DL (ref 6–20)
CALCIUM SERPL-MCNC: 9.7 MG/DL (ref 8.8–10.4)
CHLORIDE SERPL-SCNC: 106 MMOL/L (ref 98–107)
COLOR UR AUTO: YELLOW
CREAT SERPL-MCNC: 0.62 MG/DL (ref 0.51–0.95)
EGFRCR SERPLBLD CKD-EPI 2021: >90 ML/MIN/1.73M2
EOSINOPHIL # BLD AUTO: 0.2 10E3/UL (ref 0–0.7)
EOSINOPHIL NFR BLD AUTO: 3 %
ERYTHROCYTE [DISTWIDTH] IN BLOOD BY AUTOMATED COUNT: 13 % (ref 10–15)
GLUCOSE SERPL-MCNC: 100 MG/DL (ref 70–99)
GLUCOSE UR STRIP-MCNC: NEGATIVE MG/DL
HCO3 SERPL-SCNC: 22 MMOL/L (ref 22–29)
HCT VFR BLD AUTO: 44.1 % (ref 35–47)
HGB BLD-MCNC: 14.6 G/DL (ref 11.7–15.7)
HGB UR QL STRIP: NEGATIVE
IMM GRANULOCYTES # BLD: 0 10E3/UL
IMM GRANULOCYTES NFR BLD: 0 %
IRON BINDING CAPACITY (ROCHE): 300 UG/DL (ref 240–430)
IRON SATN MFR SERPL: 16 % (ref 15–46)
IRON SERPL-MCNC: 48 UG/DL (ref 37–145)
KETONES UR STRIP-MCNC: NEGATIVE MG/DL
LEUKOCYTE ESTERASE UR QL STRIP: NEGATIVE
LYMPHOCYTES # BLD AUTO: 2.3 10E3/UL (ref 0.8–5.3)
LYMPHOCYTES NFR BLD AUTO: 38 %
MCH RBC QN AUTO: 28.8 PG (ref 26.5–33)
MCHC RBC AUTO-ENTMCNC: 33.1 G/DL (ref 31.5–36.5)
MCV RBC AUTO: 87 FL (ref 78–100)
MONOCYTES # BLD AUTO: 0.5 10E3/UL (ref 0–1.3)
MONOCYTES NFR BLD AUTO: 9 %
NEUTROPHILS # BLD AUTO: 3 10E3/UL (ref 1.6–8.3)
NEUTROPHILS NFR BLD AUTO: 50 %
NITRATE UR QL: NEGATIVE
PH UR STRIP: 6.5 [PH] (ref 5–7)
PLATELET # BLD AUTO: 195 10E3/UL (ref 150–450)
POTASSIUM SERPL-SCNC: 3.6 MMOL/L (ref 3.4–5.3)
PROT SERPL-MCNC: 7.4 G/DL (ref 6.4–8.3)
RBC # BLD AUTO: 5.07 10E6/UL (ref 3.8–5.2)
SODIUM SERPL-SCNC: 139 MMOL/L (ref 135–145)
SP GR UR STRIP: 1.02 (ref 1–1.03)
UROBILINOGEN UR STRIP-ACNC: 0.2 E.U./DL
VIT B12 SERPL-MCNC: 391 PG/ML (ref 232–1245)
VIT D+METAB SERPL-MCNC: 55 NG/ML (ref 20–50)
WBC # BLD AUTO: 6 10E3/UL (ref 4–11)

## 2024-08-19 PROCEDURE — 82607 VITAMIN B-12: CPT | Performed by: NURSE PRACTITIONER

## 2024-08-19 PROCEDURE — 99213 OFFICE O/P EST LOW 20 MIN: CPT | Performed by: NURSE PRACTITIONER

## 2024-08-19 PROCEDURE — 36415 COLL VENOUS BLD VENIPUNCTURE: CPT | Performed by: NURSE PRACTITIONER

## 2024-08-19 PROCEDURE — 83550 IRON BINDING TEST: CPT | Performed by: NURSE PRACTITIONER

## 2024-08-19 PROCEDURE — 82306 VITAMIN D 25 HYDROXY: CPT | Performed by: NURSE PRACTITIONER

## 2024-08-19 PROCEDURE — 80053 COMPREHEN METABOLIC PANEL: CPT | Performed by: NURSE PRACTITIONER

## 2024-08-19 PROCEDURE — 81003 URINALYSIS AUTO W/O SCOPE: CPT | Performed by: NURSE PRACTITIONER

## 2024-08-19 PROCEDURE — 85025 COMPLETE CBC W/AUTO DIFF WBC: CPT | Performed by: NURSE PRACTITIONER

## 2024-08-19 PROCEDURE — 83540 ASSAY OF IRON: CPT | Performed by: NURSE PRACTITIONER

## 2024-08-19 RX ORDER — NAPROXEN SODIUM 500 MG/1
500 TABLET, FILM COATED, EXTENDED RELEASE ORAL DAILY
Qty: 20 TABLET | Refills: 0 | Status: SHIPPED | OUTPATIENT
Start: 2024-08-19

## 2024-08-19 RX ORDER — CHOLECALCIFEROL (VITAMIN D3) 1250 MCG
CAPSULE ORAL
COMMUNITY
Start: 2024-06-22

## 2024-08-19 RX ORDER — METHOCARBAMOL 500 MG/1
500 TABLET, FILM COATED ORAL 4 TIMES DAILY PRN
Qty: 30 TABLET | Refills: 0 | Status: SHIPPED | OUTPATIENT
Start: 2024-08-19

## 2024-08-19 RX ORDER — ERGOCALCIFEROL 1.25 MG/1
CAPSULE, LIQUID FILLED ORAL WEEKLY
COMMUNITY
Start: 2023-12-16

## 2024-08-19 RX ORDER — OMEGA-3S/DHA/EPA/FISH OIL/D3 300MG-1000
1 CAPSULE ORAL DAILY
COMMUNITY
Start: 2023-12-10 | End: 2024-01-09

## 2024-08-19 NOTE — LETTER
August 21, 2024      Aliyah Qucah  [address]  [facility] [address]        Dear ,    We are writing to inform you of your test results.    Your test results fall within the expected range.    Resulted Orders   UA Macroscopic with reflex to Microscopic and Culture - Lab Collect   Result Value Ref Range    Color Urine Yellow Colorless, Straw, Light Yellow, Yellow    Appearance Urine Clear Clear    Glucose Urine Negative Negative mg/dL    Bilirubin Urine Negative Negative    Ketones Urine Negative Negative mg/dL    Specific Gravity Urine 1.025 1.003 - 1.035    Blood Urine Negative Negative    pH Urine 6.5 5.0 - 7.0    Protein Albumin Urine Negative Negative mg/dL    Urobilinogen Urine 0.2 0.2, 1.0 E.U./dL    Nitrite Urine Negative Negative    Leukocyte Esterase Urine Negative Negative    Narrative    Microscopic not indicated   Vitamin D Deficiency   Result Value Ref Range    Vitamin D, Total (25-Hydroxy) 55 (H) 20 - 50 ng/mL      Comment:      indicates supplementation, with increased risk of hypercalciuria    Narrative    Season, race, dietary intake, and treatment affect the concentration of 25-hydroxy-Vitamin D. Values may decrease during winter months and increase during summer months.    Vitamin D determination is routinely performed by an immunoassay specific for 25 hydroxyvitamin D3.  If an individual is on vitamin D2(ergocalciferol) supplementation, please specify 25 OH vitamin D2 and D3 level determination by LCMSMS test [id number].     Comprehensive metabolic panel   Result Value Ref Range    Sodium 139 135 - 145 mmol/L    Potassium 3.6 3.4 - 5.3 mmol/L    Carbon Dioxide (CO2) 22 22 - 29 mmol/L    Anion Gap 11 7 - 15 mmol/L    Urea Nitrogen 7.8 6.0 - 20.0 mg/dL    Creatinine 0.62 0.51 - 0.95 mg/dL    GFR Estimate >90 >60 mL/min/1.73m2      Comment:      eGFR calculated using 2021 CKD-EPI equation.    Calcium 9.7 8.8 - 10.4 mg/dL      Comment:      Reference intervals for this test were  updated on 7/16/2024 to reflect our healthy population more accurately. There may be differences in the flagging of prior results with similar values performed with this method. Those prior results can be interpreted in the context of the updated reference intervals.    Chloride 106 98 - 107 mmol/L    Glucose 100 (H) 70 - 99 mg/dL    Alkaline Phosphatase 99 40 - 150 U/L    AST 18 0 - 45 U/L    ALT 10 0 - 50 U/L    Protein Total 7.4 6.4 - 8.3 g/dL    Albumin 4.0 3.5 - 5.2 g/dL    Bilirubin Total 0.2 <=1.2 mg/dL   Iron and iron binding capacity   Result Value Ref Range    Iron 48 37 - 145 ug/dL    Iron Binding Capacity 300 240 - 430 ug/dL    Iron Sat Index 16 15 - 46 %   Vitamin B12   Result Value Ref Range    Vitamin B12 391 232 - 1,245 pg/mL   CBC with platelets and differential   Result Value Ref Range    WBC Count 6.0 4.0 - 11.0 10e3/uL    RBC Count 5.07 3.80 - 5.20 10e6/uL    Hemoglobin 14.6 11.7 - 15.7 g/dL    Hematocrit 44.1 35.0 - 47.0 %    MCV 87 78 - 100 fL    MCH 28.8 26.5 - 33.0 pg    MCHC 33.1 31.5 - 36.5 g/dL    RDW 13.0 10.0 - 15.0 %    Platelet Count 195 150 - 450 10e3/uL    % Neutrophils 50 %    % Lymphocytes 38 %    % Monocytes 9 %    % Eosinophils 3 %    % Basophils 1 %    % Immature Granulocytes 0 %    Absolute Neutrophils 3.0 1.6 - 8.3 10e3/uL    Absolute Lymphocytes 2.3 0.8 - 5.3 10e3/uL    Absolute Monocytes 0.5 0.0 - 1.3 10e3/uL    Absolute Eosinophils 0.2 0.0 - 0.7 10e3/uL    Absolute Basophils 0.0 0.0 - 0.2 10e3/uL    Absolute Immature Granulocytes 0.0 <=0.4 10e3/uL       If you have any questions or concerns, please call the clinic at the number listed above.       Sincerely,      Ban Mckeno NP

## 2024-08-19 NOTE — PROGRESS NOTES
Assessment & Plan   Problem List Items Addressed This Visit    None  Visit Diagnoses       Acute midline thoracic back pain    -  Primary    Relevant Medications    methocarbamol (ROBAXIN) 500 MG tablet    naproxen sodium ER (NAPRELAN) 500 MG 24 hr tablet    Other Relevant Orders    UA Macroscopic with reflex to Microscopic and Culture - Lab Collect (Completed)    Screening, iron deficiency anemia        Encounter for vitamin deficiency screening        Relevant Orders    CBC with platelets and differential    Vitamin D Deficiency    Comprehensive metabolic panel    Iron and iron binding capacity    Vitamin B12           34-year-old patient presents to clinic accompanied by family member who provides  services with a chief concern for 1 week history of thoracic back discomfort without known injury.  Patient denies any exacerbating factors does report some improvement with massage has not trialed heat ice Tylenol or ibuprofen no concern for pregnancy denies any urinary symptoms no coughing or shortness of breath.  Physical exam is relatively unremarkable she does have bilateral thoracic back discomfort she is also requesting some vitamin screening labs and these will be completed today for her back pain will recommend Robaxin 500 4 times daily as needed Naprosyn 500 every 12 hours alternate with Tylenol trial some heat or ice at 20-minute increments if massage is helpful would recommend this as well.  They are advised if symptoms persist or worsen or new symptom arise to follow-up at that time would recommend physical therapy all patient and family members questions were addressed they verbalized understanding agree with plan.           No follow-ups on file.      Estelle Ly is a 34 year old, presenting for the following health issues:  Urgent Care (Pt came in for blood pressure check, also has back pain, not feeling well.)         No data to display              HPI               Review of  Systems  Constitutional, HEENT, cardiovascular, pulmonary, GI, , musculoskeletal, neuro, skin, endocrine and psych systems are negative, except as otherwise noted.      Objective    /84   Pulse 69   Temp 97.7  F (36.5  C) (Tympanic)   Resp 18   Wt 108.6 kg (239 lb 6.4 oz)   SpO2 99%   There is no height or weight on file to calculate BMI.  Physical Exam   GENERAL: alert and no distress  GENERAL: alert and no distress  NECK: no adenopathy, no asymmetry, masses, or scars  RESP: lungs clear to auscultation - no rales, rhonchi or wheezes  CV: regular rate and rhythm, normal S1 S2, no S3 or S4, no murmur, click or rub, no peripheral edema  ABDOMEN: soft, nontender, no hepatosplenomegaly, no masses and bowel sounds normal  MS: no gross musculoskeletal defects noted, no edema back assessed in seated position normal curvature she has some bilateral thoracic discomfort no CVA tenderness.  PSYCH: mentation appears normal, affect normal/bright            Signed Electronically by: Ban Mckeon NP

## 2025-03-12 ENCOUNTER — OFFICE VISIT (OUTPATIENT)
Dept: URGENT CARE | Facility: URGENT CARE | Age: 35
End: 2025-03-12
Payer: COMMERCIAL

## 2025-03-12 VITALS
DIASTOLIC BLOOD PRESSURE: 72 MMHG | RESPIRATION RATE: 19 BRPM | TEMPERATURE: 98.1 F | OXYGEN SATURATION: 98 % | SYSTOLIC BLOOD PRESSURE: 114 MMHG | HEART RATE: 63 BPM | WEIGHT: 239 LBS

## 2025-03-12 DIAGNOSIS — S39.012A BACK STRAIN, INITIAL ENCOUNTER: ICD-10-CM

## 2025-03-12 DIAGNOSIS — M54.6 ACUTE LEFT-SIDED THORACIC BACK PAIN: Primary | ICD-10-CM

## 2025-03-12 PROCEDURE — 3078F DIAST BP <80 MM HG: CPT | Performed by: NURSE PRACTITIONER

## 2025-03-12 PROCEDURE — 99214 OFFICE O/P EST MOD 30 MIN: CPT | Performed by: NURSE PRACTITIONER

## 2025-03-12 PROCEDURE — 3074F SYST BP LT 130 MM HG: CPT | Performed by: NURSE PRACTITIONER

## 2025-03-12 RX ORDER — CYCLOBENZAPRINE HCL 10 MG
5-10 TABLET ORAL 3 TIMES DAILY PRN
Qty: 30 TABLET | Refills: 0 | Status: SHIPPED | OUTPATIENT
Start: 2025-03-12 | End: 2025-03-22

## 2025-03-12 RX ORDER — METHYLPREDNISOLONE 4 MG/1
TABLET ORAL
Qty: 21 TABLET | Refills: 0 | Status: SHIPPED | OUTPATIENT
Start: 2025-03-12

## 2025-03-12 RX ORDER — IBUPROFEN 600 MG/1
600 TABLET, FILM COATED ORAL EVERY 8 HOURS
Qty: 30 TABLET | Refills: 0 | Status: SHIPPED | OUTPATIENT
Start: 2025-03-12 | End: 2025-03-22

## 2025-03-12 NOTE — LETTER
March 12, 2025      Aliyah Quach  [address] [place] [address]        To Whom It May Concern:    Aliyah Quach  was seen on 03/12/2025.  Please excuse her  until 03/18/2025 due to illness.        Sincerely,        KIMBERLYN CABALLERO CNP    Electronically signed

## 2025-03-12 NOTE — PROGRESS NOTES
Chief Complaint   Patient presents with    Urgent Care     Pt presents L mid back pain, hard to breath when experiencing the pain, unable to stand for awhile, onset 2 days.      [employer]  used for entire visit.    ICD-10-CM    1. Acute left-sided thoracic back pain  M54.6 cyclobenzaprine (FLEXERIL) 10 MG tablet     ibuprofen (ADVIL/MOTRIN) 600 MG tablet     methylPREDNISolone (MEDROL DOSEPAK) 4 MG tablet therapy pack      2. Back strain, initial encounter  S39.012A cyclobenzaprine (FLEXERIL) 10 MG tablet     ibuprofen (ADVIL/MOTRIN) 600 MG tablet     methylPREDNISolone (MEDROL DOSEPAK) 4 MG tablet therapy pack      Medrol Dosepak, Flexeril and ibuprofen as needed.  Back stretches, ice and/or heat.  Follow-up in 7 to 10 days if symptoms or not improving.    Red flag warning signs and when to go to the emergency room discussed.  Reviewed potential adverse reactions to medications.    35 minutes total time spent reviewing patient's chart, completing history and physical exam, establishing plan of care, and completing documentation.    Subjective     Aliyah Quach is an 35 year old female who presents to clinic today for left sided mid back pain for 2 days. She had the pain a little last week, but worse over the last couple of days. Unable to bend over now without worsening pain. She has not tried any treatments.  She works as a  but this involves no lifting.  She denies any new trauma or activities that may have caused strain.  No fever, chills, nausea vomiting, urinary symptoms.  No numbness or tingling in the legs.    Daily BM.      Objective    /72   Pulse 63   Temp 98.1  F (36.7  C) (Oral)   Resp 19   Wt 108.4 kg (239 lb)   SpO2 98%   Nurses notes and VS have been reviewed.    Physical Exam       GENERAL APPEARANCE: alert and mild distress     RESP: lungs clear to auscultation - no rales, rhonchi or wheezes     CV: regular rates and rhythm, no murmurs, rubs, or gallop     ABDOMEN:   soft, nontender, no HSM or masses and bowel sounds normal     MS: extremities normal- no gross deformities noted; normal muscle tone.  Left thoracic muscular tenderness on palpation, no pain over the midline, normal deep tendon reflexes in legs.     SKIN: no suspicious lesions or rashes     NEURO: Normal strength and tone, mentation intact and speech normal, normal deep tendon reflexes in lower extremities.      CHET Villanueva, CNP  [facility]    The use of Dragon/[employer] dictation services may have been used to construct the content in this note; any grammatical or spelling errors are non-intentional. Please contact the author of this note directly if you are in need of any clarification.

## 2025-04-08 ENCOUNTER — PRE VISIT (OUTPATIENT)
Dept: NEUROSURGERY | Facility: CLINIC | Age: 35
End: 2025-04-08

## 2025-04-08 ENCOUNTER — OFFICE VISIT (OUTPATIENT)
Dept: NEUROSURGERY | Facility: CLINIC | Age: 35
End: 2025-04-08
Payer: COMMERCIAL

## 2025-04-08 VITALS — DIASTOLIC BLOOD PRESSURE: 77 MMHG | WEIGHT: 237.3 LBS | SYSTOLIC BLOOD PRESSURE: 117 MMHG | HEART RATE: 77 BPM

## 2025-04-08 DIAGNOSIS — M54.50 CHRONIC BILATERAL LOW BACK PAIN WITHOUT SCIATICA: ICD-10-CM

## 2025-04-08 DIAGNOSIS — M99.05 SOMATIC DYSFUNCTION OF PELVIS REGION: Primary | ICD-10-CM

## 2025-04-08 DIAGNOSIS — G89.29 CHRONIC BILATERAL LOW BACK PAIN WITHOUT SCIATICA: ICD-10-CM

## 2025-04-08 ASSESSMENT — PAIN SCALES - GENERAL: PAINLEVEL_OUTOF10: SEVERE PAIN (10)

## 2025-04-08 NOTE — PROGRESS NOTES
"    SUBJECTIVE:  HPI:  Aliyah Quach  Is a 35 year old female who presents for new patient evaluation of low back pain.  Self-referred.  No records.    A virtual  hung up before we could start.  Her son Cleopatra is fluent in English and they agreed to let him be the .    Onset was about a month ago without any inciting event, and it feels like \"a thorn\" in bilateral lumbar muscle areas.  There was a mistake in the questionnaire when her son was clicking yes for weakness loss of bladder and inability to urinate.  The patient specifically denies cauda equina and radicular symptoms.  Localized tenderness in her low back.  She has to stand sit and sleep.  Stretching helps.  She is constipated.  She went to [facility] but I cannot access the records and she wanted to get a \"scan\" but they did not give her 1.  She went to a different facility and they gave her medications (Medrol, Robaxin, Naprosyn) and that did not help.      Pain score and diagram reviewed.  See questionnaire.      ROS: .  Otherwise negative for bowel/bladder dysfunction, balance changes, headache, leg pain/numbness/weakness, fevers, chills, night sweats, unexplained weight loss;  otherwise unremarkable.   See the patient's intake questionnaire from today for details.    MEDICATIONS:  Reviewed.    ALLERGIES:  Reviewed.     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  She is from [place] and has marginal English abilities.  She is a single mother of 5 children.  Sports hobbies and activities: Walking, take care of her kids      OBJECTIVE:    IMAGING: Images and reports reviewed    Lumbar spine x-rays, 1/13/2023     Findings: AP and lateral nonweightbearing views of the lumbar spine. There are 5 lumbar-type vertebral bodies. No acute fracture or malalignment is suspected. The disc heights appear preserved. Two radiodensities project in the " left pelvis; it is uncertain if these are within the patient or external to the patient. The bowel gas pattern appears nonobstructive.     EXAMINATION:    --CONSTITUTIONAL:   No acute distress.  The patient is well nourished and well groomed.  Elevated BMI.  Marked pain behavior.  Deconditioned and has difficult time getting on and off the table, moving around, and following instructions.  --SKIN:  Skin over the face, bilateral lower extremities, and posterior torso is clean, dry, intact without rashes.    --GAIT:  is non-antalgic. Flat foot, heel and toe walking:  normal   .  Squat and rise   normal    .  --STANDING EXAMINATION:    Symmetry of spine/pelvis   unremarkable   .      Range of motion full forward flexion.  She points to left and right of the low back and left and right of the lower rib cage as the site of her pain.   Standing flexion   negative   .    Lola's sign   negative    but somewhat inconsistent with her prone exam..     Stork test      not done secondary to the language barrier.   --NEUROLOGICAL:    SENSATION to light touch is intact in bilateral thighs, lower legs and feet.   REFLEXES:  patellar 1+, and achilles absent.    MANUAL MOTOR TESTING:  Hip flexion 5/5   Hip abduction 5/5   Hip adduction 5/5   Knee extension 5/5   Knee flexion 5/5   Ankle dorsiflexion 5/5   EHL 5/5  DURAL STRETCH TESTS:  SLR negative.  .   --PELVIC/HIP JOINTS:                Spring testing markedly positive right SI negative left.  This improved after OMT.  Indeterminate lumbar.      PELVIC ALIGNMENT difficult to assess due to body habitus and cooperation/language barrier but it appears she has a right posterior sacral torsion..   --LUMBAR/GLUTEAL MUSCLES: Negative.    Procedure note-OMT:  Manual medicine for a presumed right posterior sacral torsion resulting in some decreased pain with spring testing.  He had normal range of motion but it still hurt with both flexion and extension    ASSESSMENT: Aliyah Quach  is a 35 year old female who presents  today for new patient evaluation of:    Low back pain  Pelvic Joint Dysfunction suspected manifesting as a probable right posterior sacral torsion-indeterminate response to OMT  Obesity and deconditioning  Neurologically      PLAN:  I explained that imaging at this point is not important but I do want her to start some physical therapy with a pelvic stabilization program as well as a reconditioning program.  She will see Cory Gonzales and Cira.  If not improved, return to clinic in 6 weeks and consider x-rays and/or advanced imaging.  I have also encouraged her through her son to become more physically active, because obesity of this extent at her age puts her at risk of poor health in the future.  She is fixated on the need for a scan but I told her that in this situation conservative care is appropriate initially.    Advised patient to call or return early if symptoms worsen, or having problems controlling bladder and bowel function or worsening leg weakness.     Please note: Voice recognition software was used in this dictation.  It may therefore contain typographical errors.    Morris Carrizales MD

## 2025-04-08 NOTE — PATIENT INSTRUCTIONS
See my therapist and do the exercises they teach you.  If you are not getting better in 6 weeks come back and see me.

## 2025-04-08 NOTE — NURSING NOTE
Reason For Visit:   Chief Complaint   Patient presents with    Consult     Low back pain         Occupation:   Currently working? Yes.  Work status?  Full time.    Sports: n  Activities: walking             /77   Pulse 77   Wt 107.6 kg (237 lb 4.8 oz)       Allergies   Allergen Reactions    Chicken-Derived Products (Egg) Rash    Fish-Derived Products Rash       Current Outpatient Medications   Medication Sig Dispense Refill    cholecalciferol (VITAMIN D3) 1250 mcg (24464 units) capsule TAKE ONE CAPSULE BY MOUTH WEEKLY FOR 56 DAYS (Patient not taking: Reported on 3/12/2025)      methocarbamol (ROBAXIN) 500 MG tablet Take 1 tablet (500 mg) by mouth 4 times daily as needed for muscle spasms (Patient not taking: Reported on 3/12/2025) 30 tablet 0    methylPREDNISolone (MEDROL DOSEPAK) 4 MG tablet therapy pack Follow Package Directions (Patient not taking: Reported on 4/8/2025) 21 tablet 0    naproxen sodium ER (NAPRELAN) 500 MG 24 hr tablet Take 1 tablet (500 mg) by mouth daily (Patient not taking: Reported on 3/12/2025) 20 tablet 0    vitamin D2 (ERGOCALCIFEROL) 56362 units (1250 mcg) capsule Take by mouth once a week (Patient not taking: Reported on 3/12/2025)       No current facility-administered medications for this visit.         Darla Severin-Brown, LPN

## 2025-04-08 NOTE — LETTER
"4/8/2025      Aliyah Quach  [address]  [facility] [address]      Dear Colleague,    Thank you for referring your patient, Aliyah Quach, to the [facility] [place]. Please see a copy of my visit note below.        SUBJECTIVE:  HPI:  Aliyah Quach  Is a 35 year old female who presents for new patient evaluation of low back pain.  Self-referred.  No records.    A virtual  hung up before we could start.  Her son Cleopatra is fluent in English and they agreed to let him be the .    Onset was about a month ago without any inciting event, and it feels like \"a thorn\" in bilateral lumbar muscle areas.  There was a mistake in the questionnaire when her son was clicking yes for weakness loss of bladder and inability to urinate.  The patient specifically denies cauda equina and radicular symptoms.  Localized tenderness in her low back.  She has to stand sit and sleep.  Stretching helps.  She is constipated.  She went to [facility] but I cannot access the records and she wanted to get a \"scan\" but they did not give her 1.  She went to a different facility and they gave her medications (Medrol, Robaxin, Naprosyn) and that did not help.      Pain score and diagram reviewed.  See questionnaire.      ROS: .  Otherwise negative for bowel/bladder dysfunction, balance changes, headache, leg pain/numbness/weakness, fevers, chills, night sweats, unexplained weight loss;  otherwise unremarkable.   See the patient's intake questionnaire from today for details.    MEDICATIONS:  Reviewed.    ALLERGIES:  Reviewed.     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  She is from [place] and has marginal English abilities.  She is a single mother of 5 children.  Sports hobbies and activities: Walking, take care of her kids      OBJECTIVE:    IMAGING: Images and reports reviewed    Lumbar " spine x-rays, 1/13/2023     Findings: AP and lateral nonweightbearing views of the lumbar spine. There are 5 lumbar-type vertebral bodies. No acute fracture or malalignment is suspected. The disc heights appear preserved. Two radiodensities project in the left pelvis; it is uncertain if these are within the patient or external to the patient. The bowel gas pattern appears nonobstructive.     EXAMINATION:    --CONSTITUTIONAL:   No acute distress.  The patient is well nourished and well groomed.  Elevated BMI.  Marked pain behavior.  Deconditioned and has difficult time getting on and off the table, moving around, and following instructions.  --SKIN:  Skin over the face, bilateral lower extremities, and posterior torso is clean, dry, intact without rashes.    --GAIT:  is non-antalgic. Flat foot, heel and toe walking:  normal   .  Squat and rise   normal    .  --STANDING EXAMINATION:    Symmetry of spine/pelvis   unremarkable   .      Range of motion full forward flexion.  She points to left and right of the low back and left and right of the lower rib cage as the site of her pain.   Standing flexion   negative   .    Lola's sign   negative    but somewhat inconsistent with her prone exam..     Stork test      not done secondary to the language barrier.   --NEUROLOGICAL:    SENSATION to light touch is intact in bilateral thighs, lower legs and feet.   REFLEXES:  patellar 1+, and achilles absent.    MANUAL MOTOR TESTING:  Hip flexion 5/5   Hip abduction 5/5   Hip adduction 5/5   Knee extension 5/5   Knee flexion 5/5   Ankle dorsiflexion 5/5   EHL 5/5  DURAL STRETCH TESTS:  SLR negative.  .   --PELVIC/HIP JOINTS:                Spring testing markedly positive right SI negative left.  This improved after OMT.  Indeterminate lumbar.      PELVIC ALIGNMENT difficult to assess due to body habitus and cooperation/language barrier but it appears she has a right posterior sacral torsion..   --LUMBAR/GLUTEAL MUSCLES:  Negative.    Procedure note-OMT:  Manual medicine for a presumed right posterior sacral torsion resulting in some decreased pain with spring testing.  He had normal range of motion but it still hurt with both flexion and extension    ASSESSMENT: Aliyah Quach is a 35 year old female who presents  today for new patient evaluation of:    Low back pain  Pelvic Joint Dysfunction suspected manifesting as a probable right posterior sacral torsion-indeterminate response to OMT  Obesity and deconditioning  Neurologically      PLAN:  I explained that imaging at this point is not important but I do want her to start some physical therapy with a pelvic stabilization program as well as a reconditioning program.  She will see Cory Bhakta.  If not improved, return to clinic in 6 weeks and consider x-rays and/or advanced imaging.  I have also encouraged her through her son to become more physically active, because obesity of this extent at her age puts her at risk of poor health in the future.  She is fixated on the need for a scan but I told her that in this situation conservative care is appropriate initially.    Advised patient to call or return early if symptoms worsen, or having problems controlling bladder and bowel function or worsening leg weakness.     Please note: Voice recognition software was used in this dictation.  It may therefore contain typographical errors.    Morris Carrizales MD             Again, thank you for allowing me to participate in the care of your patient.        Sincerely,        Morris Carrizales MD    Electronically signed

## 2025-04-08 NOTE — TELEPHONE ENCOUNTER
REASON FOR VISIT: lowback pain   DATE OF APPT: 4/08/2025   NOTES (FOR ALL VISITS) STATUS DETAILS   OFFICE NOTE from referring provider N/A Self   MEDICATION LIST N/A    IMAGING  (FOR ALL VISITS)     XR N/A    MRI (HEAD, NECK, SPINE) N/A    CT (HEAD, NECK, SPINE) N/A